# Patient Record
Sex: FEMALE | Race: WHITE | NOT HISPANIC OR LATINO | Employment: OTHER | URBAN - METROPOLITAN AREA
[De-identification: names, ages, dates, MRNs, and addresses within clinical notes are randomized per-mention and may not be internally consistent; named-entity substitution may affect disease eponyms.]

---

## 2017-01-13 ENCOUNTER — OFFICE VISIT (OUTPATIENT)
Dept: FAMILY MEDICINE | Facility: CLINIC | Age: 67
End: 2017-01-13
Payer: COMMERCIAL

## 2017-01-13 VITALS
SYSTOLIC BLOOD PRESSURE: 136 MMHG | TEMPERATURE: 98.6 F | BODY MASS INDEX: 36.46 KG/M2 | HEIGHT: 63 IN | HEART RATE: 79 BPM | WEIGHT: 205.8 LBS | DIASTOLIC BLOOD PRESSURE: 82 MMHG | OXYGEN SATURATION: 98 %

## 2017-01-13 DIAGNOSIS — F43.21 ADJUSTMENT DISORDER WITH DEPRESSED MOOD: Primary | ICD-10-CM

## 2017-01-13 DIAGNOSIS — J44.0 CHRONIC OBSTRUCTIVE PULMONARY DISEASE WITH ACUTE LOWER RESPIRATORY INFECTION (H): ICD-10-CM

## 2017-01-13 DIAGNOSIS — R63.5 ABNORMAL WEIGHT GAIN: ICD-10-CM

## 2017-01-13 PROCEDURE — 99214 OFFICE O/P EST MOD 30 MIN: CPT | Performed by: FAMILY MEDICINE

## 2017-01-13 RX ORDER — PHENTERMINE HYDROCHLORIDE 15 MG/1
15 CAPSULE ORAL EVERY MORNING
Qty: 30 CAPSULE | Refills: 0 | Status: SHIPPED | OUTPATIENT
Start: 2017-01-13 | End: 2017-02-09

## 2017-01-13 RX ORDER — VENLAFAXINE 37.5 MG/1
37.5-75 TABLET ORAL 2 TIMES DAILY
Qty: 120 TABLET | Refills: 0 | Status: SHIPPED | OUTPATIENT
Start: 2017-01-13 | End: 2017-03-16

## 2017-01-13 RX ORDER — CITALOPRAM HYDROBROMIDE 20 MG/1
20 TABLET ORAL DAILY
Qty: 30 TABLET | Refills: 1
Start: 2017-01-13 | End: 2017-03-16

## 2017-01-13 ASSESSMENT — ANXIETY QUESTIONNAIRES
3. WORRYING TOO MUCH ABOUT DIFFERENT THINGS: SEVERAL DAYS
IF YOU CHECKED OFF ANY PROBLEMS ON THIS QUESTIONNAIRE, HOW DIFFICULT HAVE THESE PROBLEMS MADE IT FOR YOU TO DO YOUR WORK, TAKE CARE OF THINGS AT HOME, OR GET ALONG WITH OTHER PEOPLE: SOMEWHAT DIFFICULT
1. FEELING NERVOUS, ANXIOUS, OR ON EDGE: SEVERAL DAYS
GAD7 TOTAL SCORE: 9
5. BEING SO RESTLESS THAT IT IS HARD TO SIT STILL: SEVERAL DAYS
6. BECOMING EASILY ANNOYED OR IRRITABLE: SEVERAL DAYS
7. FEELING AFRAID AS IF SOMETHING AWFUL MIGHT HAPPEN: MORE THAN HALF THE DAYS
2. NOT BEING ABLE TO STOP OR CONTROL WORRYING: SEVERAL DAYS

## 2017-01-13 ASSESSMENT — PATIENT HEALTH QUESTIONNAIRE - PHQ9: 5. POOR APPETITE OR OVEREATING: MORE THAN HALF THE DAYS

## 2017-01-13 NOTE — NURSING NOTE
"Chief Complaint   Patient presents with     Recheck Medication     Initial /82 mmHg  Pulse 79  Temp(Src) 98.6  F (37  C) (Oral)  Ht 5' 3\" (1.6 m)  Wt 205 lb 12.8 oz (93.35 kg)  BMI 36.46 kg/m2  SpO2 98%  LMP 02/06/1906 Estimated body mass index is 36.46 kg/(m^2) as calculated from the following:    Height as of this encounter: 5' 3\" (1.6 m).    Weight as of this encounter: 205 lb 12.8 oz (93.35 kg).  BP completed using cuff size large left Arm  Nadia Auuth CMA    "

## 2017-01-13 NOTE — Clinical Note
My Depression Action Plan  Name: Sushma Marie   Date of Birth 1950  Date: 1/13/2017    My doctor: oJel Barrera   My clinic: [address] [address]  [phone]          GREEN    ZONE   Good Control    What it looks like:     Things are going generally well. You have normal up s and down s. You may even feel depressed from time to time, but bad moods usually last less than a day.   What you need to do:  1. Continue to care for yourself (see self care plan)  2. Check your depression survival kit and update it as needed  3. Follow your physician s recommendations including any medication.  4. Do not stop taking medication unless you consult with your physician first.           YELLOW         ZONE Getting Worse    What it looks like:     Depression is starting to interfere with your life.     It may be hard to get out of bed; you may be starting to isolate yourself from others.    Symptoms of depression are starting to last most all day and this has happened for several days.     You may have suicidal thoughts but they are not constant.   What you need to do:     1. Call your care team, your response to treatment will improve if you keep your care team informed of your progress. Yellow periods are signs an adjustment may need to be made.     2. Continue your self-care, even if you have to fake it!    3. Talk to someone in your support network    4. Open up your depression survival kit           RED    ZONE Medical Alert - Get Help    What it looks like:     Depression is seriously interfering with your life.     You may experience these or other symptoms: You can t get out of bed most days, can t work or engage in other necessary activities, you have trouble taking care of basic hygiene, or basic responsibilities, thoughts of suicide or death that will not go away, self-injurious behavior.     What you need to do:  1. Call your care  team and request a same-day appointment. If they are not available (weekends or after hours) call your local crisis line, emergency room or 911.      Electronically signed by: Gavi Avitia, January 13, 2017    Depression Self Care Plan / Survival Kit    Self-Care for Depression  Here s the deal. Your body and mind are really not as separate as most people think.  What you do and think affects how you feel and how you feel influences what you do and think. This means if you do things that people who feel good do, it will help you feel better.  Sometimes this is all it takes.  There is also a place for medication and therapy depending on how severe your depression is, so be sure to consult with your medical provider and/ or Behavioral Health Consultant if your symptoms are worsening or not improving.     In order to better manage my stress, I will:    Exercise  Get some form of exercise, every day. This will help reduce pain and release endorphins, the  feel good  chemicals in your brain. This is almost as good as taking antidepressants!  This is not the same as joining a gym and then never going! (they count on that by the way ) It can be as simple as just going for a walk or doing some gardening, anything that will get you moving.      Hygiene   Maintain good hygiene (Get out of bed in the morning, Make your bed, Brush your teeth, Take a shower, and Get dressed like you were going to work, even if you are unemployed).  If your clothes don't fit try to get ones that do.    Diet  I will strive to eat foods that are good for me, drink plenty of water, and avoid excessive sugar, caffeine, alcohol, and other mood-altering substances.  Some foods that are helpful in depression are: complex carbohydrates, B vitamins, flaxseed, fish or fish oil, fresh fruits and vegetables.    Psychotherapy  I agree to participate in Individual Therapy (if recommended).    Medication  If prescribed medications, I agree to take them.   Missing doses can result in serious side effects.  I understand that drinking alcohol, or other illicit drug use, may cause potential side effects.  I will not stop my medication abruptly without first discussing it with my provider.    Staying Connected With Others  I will stay in touch with my friends, family members, and my primary care provider/team.    Use your imagination  Be creative.  We all have a creative side; it doesn t matter if it s oil painting, sand castles, or mud pies! This will also kick up the endorphins.    Witness Beauty  (AKA stop and smell the roses) Take a look outside, even in mid-winter. Notice colors, textures. Watch the squirrels and birds.     Service to others  Be of service to others.  There is always someone else in need.  By helping others we can  get out of ourselves  and remember the really important things.  This also provides opportunities for practicing all the other parts of the program.    Humor  Laugh and be silly!  Adjust your TV habits for less news and crime-drama and more comedy.    Control your stress  Try breathing deep, massage therapy, biofeedback, and meditation. Find time to relax each day.     My support system    Clinic Contact:  Phone number:    Contact 1:  Phone number:    Contact 2:  Phone number:    Christianity/:  Phone number:    Therapist:  Phone number:    Local crisis center:    Phone number:    Other community support:  Phone number:

## 2017-01-13 NOTE — PROGRESS NOTES
SUBJECTIVE:                                                    Sushma Marie is a 66 year old female who presents to clinic today for the following health issues:      Depression Followup    Status since last visit: Worsened - but only taking 1 tablet instead of 2 prescribed     See PHQ-9 for current symptoms.  Other associated symptoms: not wanting to do things she usually would did or liked     Complicating factors:   Significant life event:  No   Current substance abuse:  None  Anxiety or Panic symptoms:  No    PHQ-9  English PHQ-9   Any Language            Amount of exercise or physical activity: None    Problems taking medications regularly: Yes,  Only taking 1 a day instead of two - but certain times with specific events could use increased amount     Medication side effects: none    Diet: regular (no restrictions)      Problem list and histories reviewed & adjusted, as indicated.  Additional history: as documented    Patient Active Problem List   Diagnosis     Osteoporosis     COPD (chronic obstructive pulmonary disease) (H)     Dyslipidemia     HYPERLIPIDEMIA LDL GOAL <130     Adjustment disorder with depressed mood     Advanced directives, counseling/discussion     Past Surgical History   Procedure Laterality Date     Hc remove tonsils/adenoids,<12 y/o         Social History   Substance Use Topics     Smoking status: Former Smoker     Smokeless tobacco: Never Used      Comment: quit 12 years ago     Alcohol Use: Yes      Comment: social     Family History   Problem Relation Age of Onset     CANCER Mother       stomach cancer     Respiratory Father       emphysema and dementia     Family History Negative Brother       drug overdose     Family History Negative Sister      Family History Negative Sister       from status asthmaticus     HEART DISEASE Maternal Grandmother       stroke     HEART DISEASE Maternal Grandfather       MI     Anesthesia Reaction Daughter   "    Thyroid Disease Son      Asthma Child      OSTEOPOROSIS Maternal Aunt      Breast Cancer Maternal Aunt          Current Outpatient Prescriptions   Medication Sig Dispense Refill     venlafaxine (EFFEXOR) 37.5 MG tablet Take 1-2 tablets (37.5-75 mg) by mouth 2 times daily 120 tablet 0     citalopram (CELEXA) 20 MG tablet Take 1 tablet (20 mg) by mouth daily 30 tablet 1     phentermine 15 MG capsule Take 1 capsule (15 mg) by mouth every morning 30 capsule 0     atorvastatin (LIPITOR) 20 MG tablet Take 1 tablet (20 mg) by mouth daily 90 tablet 3     IBANdronate (BONIVA) 150 MG tablet Take 1 tablet (150 mg) by mouth every 30 days 12 tablet 1     SYMBICORT 80-4.5 MCG/ACT IN AERO two puffs bid       SPIRIVA HANDIHALER 18 MCG IN CAPS 1 CAPSULE DAILY       PROAIR  (90 BASE) MCG/ACT IN AERS INHALE 1 TO 2 PUFFS EVERY 4 TO 6 HOURS AS NEEDED 1 0     [DISCONTINUED] citalopram (CELEXA) 20 MG tablet Take 2 tablets (40 mg) by mouth daily 30 tablet 1     Allergies   Allergen Reactions     Sulfa [Sulfacetamide Sodium] GI Disturbance     Recent Labs   Lab Test  05/16/16   1430  04/06/15   1008  02/13/14   1433   LDL  84  73  72   HDL  69  75  66   TRIG  172*  82  59   ALT  26  27  32   CR  0.71  0.68  0.59   GFRESTIMATED  82  87  >90   GFRESTBLACK  >90   GFR Calc    >90   GFR Calc    >90   POTASSIUM  3.9  4.2  4.5      BP Readings from Last 3 Encounters:   01/13/17 136/82   05/16/16 138/84   04/06/15 120/70    Wt Readings from Last 3 Encounters:   01/13/17 205 lb 12.8 oz (93.35 kg)   05/16/16 201 lb 1.6 oz (91.218 kg)   04/06/15 195 lb 12.8 oz (88.814 kg)                    ROS:  Constitutional, HEENT, cardiovascular, pulmonary, gi and gu systems are negative, except as otherwise noted.    OBJECTIVE:                                                    /82 mmHg  Pulse 79  Temp(Src) 98.6  F (37  C) (Oral)  Ht 5' 3\" (1.6 m)  Wt 205 lb 12.8 oz (93.35 kg)  BMI 36.46 kg/m2  SpO2 98%  " LMP 02/06/1906  Body mass index is 36.46 kg/(m^2).  GENERAL: healthy, alert and no distress  NECK: no adenopathy, no asymmetry, masses, or scars and thyroid normal to palpation  RESP: lungs clear to auscultation - no rales, rhonchi or wheezes  CV: regular rate and rhythm, normal S1 S2, no S3 or S4, no murmur, click or rub, no peripheral edema and peripheral pulses strong  ABDOMEN: soft, nontender, no hepatosplenomegaly, no masses and bowel sounds normal  MS: no gross musculoskeletal defects noted, no edema         ASSESSMENT/PLAN:                                                    ASSESSMENT / PLAN:  (F43.21) Adjustment disorder with depressed mood  (primary encounter diagnosis)  Comment: has been worsening, will have her to try switching from citalopram to Effexor for the benefits of cardiovascular and safety window   Plan: venlafaxine (EFFEXOR) 37.5 MG tablet,         citalopram (CELEXA) 20 MG tablet        She will be at 75mg of Effexor by the end to next 30 days, and will contact us for updating. We will consider dose adjustment at the time if needed.     (R63.5) Abnormal weight gain  Comment: worsening with depressed mood and stress, will have her to try 1-2 months with phentermine  Plan: phentermine 15 MG capsule        If not effective, will have her to stop taking it     (J44.0) Chronic obstructive pulmonary disease with acute lower respiratory infection (H)  Comment: stable with current dose of regimen, will keep watching sx   Plan: mentioned above       FUTURE APPOINTMENTS:       - Follow-up visit in 6 months for med remington Melgoza MD  [facility]

## 2017-01-14 ASSESSMENT — ANXIETY QUESTIONNAIRES: GAD7 TOTAL SCORE: 9

## 2017-01-14 ASSESSMENT — PATIENT HEALTH QUESTIONNAIRE - PHQ9: SUM OF ALL RESPONSES TO PHQ QUESTIONS 1-9: 17

## 2017-02-09 DIAGNOSIS — R63.5 ABNORMAL WEIGHT GAIN: Primary | ICD-10-CM

## 2017-02-09 DIAGNOSIS — F43.21 ADJUSTMENT DISORDER WITH DEPRESSED MOOD: ICD-10-CM

## 2017-02-09 RX ORDER — PHENTERMINE HYDROCHLORIDE 15 MG/1
15 CAPSULE ORAL EVERY MORNING
Qty: 30 CAPSULE | Refills: 0 | Status: SHIPPED | OUTPATIENT
Start: 2017-02-09 | End: 2017-03-16

## 2017-02-09 RX ORDER — VENLAFAXINE 75 MG/1
75 TABLET ORAL 2 TIMES DAILY
Qty: 60 TABLET | Refills: 1 | Status: SHIPPED | OUTPATIENT
Start: 2017-02-09 | End: 2017-03-16

## 2017-02-09 RX ORDER — VENLAFAXINE 37.5 MG/1
75 TABLET ORAL 2 TIMES DAILY
Qty: 360 TABLET | Refills: 1 | OUTPATIENT
Start: 2017-02-09

## 2017-02-09 NOTE — TELEPHONE ENCOUNTER
I need to know how much Effexor she is taking, and     And, I need to know her wt current for me to refill med,

## 2017-02-09 NOTE — TELEPHONE ENCOUNTER
Spoke with patient, states medication is working really well and feeling much better. PHQ obtained. States phentermine is also working very well and is requesting a refill.     PHQ-9 SCORE 8/3/2016 1/13/2017 2/9/2017   Total Score - - -   Total Score MyChart - - -   Total Score 20 17 4         phentermine      Last Written Prescription Date:  1/13/17  Last Fill Quantity: 30,   # refills: 0  Last Office Visit with [facility] prescribing provider: 1/13/17  Future Office visit:       Routing refill request to provider for review/approval because:  Drug not on the [facility] or [facility] [place] refill protocol or controlled substance    Shannan Sanz RN   [facility] - Triage

## 2017-02-09 NOTE — TELEPHONE ENCOUNTER
1/13/17 OV note:    ASSESSMENT/PLAN:                                                      ASSESSMENT / PLAN:  (F43.21) Adjustment disorder with depressed mood  (primary encounter diagnosis)  Comment: has been worsening, will have her to try switching from citalopram to Effexor for the benefits of cardiovascular and safety window   Plan: venlafaxine (EFFEXOR) 37.5 MG tablet,          citalopram (CELEXA) 20 MG tablet        She will be at 75mg of Effexor by the end to next 30 days, and will contact us for updating. We will consider dose adjustment at the time if needed.             Need to call patient for update to see how increased dose is working for her.     Left non detailed message for patient to return call.  Shannan Sanz RN   [facility] - Triage

## 2017-02-09 NOTE — TELEPHONE ENCOUNTER
VENLAFAXINE HCL 37.5 MG TABLET    Last Written Prescription Date: 01/13/2017  Last Fill Quantity: 120, # refills: 0  Last Office Visit with [facility], [facility] or [facility] prescribing provider: 01/13/2017        BP Readings from Last 3 Encounters:   01/13/17 136/82   05/16/16 138/84   04/06/15 120/70     Pulse: (for F[place]ima)  CREATININE   Date Value Ref Range Status   05/16/2016 0.71 0.52 - 1.04 mg/dL Final   ]    Last PHQ-9 score on record=   PHQ-9 SCORE 1/13/2017   Total Score -   Total Score MyChart -   Total Score 17

## 2017-02-09 NOTE — TELEPHONE ENCOUNTER
Called patient, she is currently taking 37.5 mg 2 bid of Effexor    194 lb on Monday at June Pack RN  [facility]  [phone]

## 2017-02-09 NOTE — TELEPHONE ENCOUNTER
Please have her to [facility] for f/u at next month  And I will also send 75mg tablets Effexor, she can take 1 tablets twice daily

## 2017-02-10 ASSESSMENT — PATIENT HEALTH QUESTIONNAIRE - PHQ9: SUM OF ALL RESPONSES TO PHQ QUESTIONS 1-9: 4

## 2017-02-10 NOTE — TELEPHONE ENCOUNTER
Spoke with patient and informed of below. Patient verbalized understanding and agrees with plan.   Shannan Sanz RN   [facility] - Triage

## 2017-02-13 ENCOUNTER — TELEPHONE (OUTPATIENT)
Dept: FAMILY MEDICINE | Facility: CLINIC | Age: 67
End: 2017-02-13

## 2017-02-13 NOTE — TELEPHONE ENCOUNTER
Fax from pt's pharmacy stating her Phentermine isn't covered by her insurance.    Do you want to proceed with PA or change med?    Orin Barlow CMA

## 2017-03-16 ENCOUNTER — OFFICE VISIT (OUTPATIENT)
Dept: FAMILY MEDICINE | Facility: CLINIC | Age: 67
End: 2017-03-16
Payer: COMMERCIAL

## 2017-03-16 VITALS
DIASTOLIC BLOOD PRESSURE: 72 MMHG | BODY MASS INDEX: 32.07 KG/M2 | TEMPERATURE: 97.1 F | SYSTOLIC BLOOD PRESSURE: 122 MMHG | WEIGHT: 181 LBS | HEIGHT: 63 IN | HEART RATE: 97 BPM | OXYGEN SATURATION: 96 %

## 2017-03-16 DIAGNOSIS — F43.21 ADJUSTMENT DISORDER WITH DEPRESSED MOOD: ICD-10-CM

## 2017-03-16 DIAGNOSIS — R63.5 ABNORMAL WEIGHT GAIN: ICD-10-CM

## 2017-03-16 PROCEDURE — 99213 OFFICE O/P EST LOW 20 MIN: CPT | Performed by: FAMILY MEDICINE

## 2017-03-16 RX ORDER — VENLAFAXINE 75 MG/1
75 TABLET ORAL 2 TIMES DAILY
Qty: 180 TABLET | Refills: 3 | Status: SHIPPED | OUTPATIENT
Start: 2017-03-16 | End: 2017-06-05

## 2017-03-16 RX ORDER — PHENTERMINE HYDROCHLORIDE 15 MG/1
15 CAPSULE ORAL EVERY MORNING
Qty: 30 CAPSULE | Refills: 0 | Status: SHIPPED | OUTPATIENT
Start: 2017-03-16 | End: 2017-07-13

## 2017-03-16 ASSESSMENT — ANXIETY QUESTIONNAIRES
1. FEELING NERVOUS, ANXIOUS, OR ON EDGE: SEVERAL DAYS
7. FEELING AFRAID AS IF SOMETHING AWFUL MIGHT HAPPEN: SEVERAL DAYS
5. BEING SO RESTLESS THAT IT IS HARD TO SIT STILL: NOT AT ALL
GAD7 TOTAL SCORE: 5
IF YOU CHECKED OFF ANY PROBLEMS ON THIS QUESTIONNAIRE, HOW DIFFICULT HAVE THESE PROBLEMS MADE IT FOR YOU TO DO YOUR WORK, TAKE CARE OF THINGS AT HOME, OR GET ALONG WITH OTHER PEOPLE: NOT DIFFICULT AT ALL
6. BECOMING EASILY ANNOYED OR IRRITABLE: SEVERAL DAYS
2. NOT BEING ABLE TO STOP OR CONTROL WORRYING: SEVERAL DAYS
3. WORRYING TOO MUCH ABOUT DIFFERENT THINGS: SEVERAL DAYS

## 2017-03-16 ASSESSMENT — PATIENT HEALTH QUESTIONNAIRE - PHQ9: 5. POOR APPETITE OR OVEREATING: NOT AT ALL

## 2017-03-16 NOTE — NURSING NOTE
"Chief Complaint   Patient presents with     Recheck Medication       Initial /72  Pulse 97  Temp 97.1  F (36.2  C) (Tympanic)  Ht 5' 3\" (1.6 m)  Wt 181 lb (82.1 kg)  LMP 02/06/1906  SpO2 96%  BMI 32.06 kg/m2 Estimated body mass index is 32.06 kg/(m^2) as calculated from the following:    Height as of this encounter: 5' 3\" (1.6 m).    Weight as of this encounter: 181 lb (82.1 kg).  Medication Reconciliation: complete     Orin Barlow CMA      "

## 2017-03-16 NOTE — MR AVS SNAPSHOT
"              After Visit Summary   3/16/2017    Sushma Marie    MRN: [id number]           Patient Information     Date Of Birth          1950        Visit Information        Provider Department      3/16/2017 12:40 PM Manjit Melgoza MD [facility] [place]        Today's Diagnoses     Adjustment disorder with depressed mood        Abnormal weight gain           Follow-ups after your visit        Who to contact     If you have questions or need follow up information about today's clinic visit or your schedule please contact [facility] directly at [phone].  Normal or non-critical lab and imaging results will be communicated to you by [employer], letter or phone within 4 business days after the clinic has received the results. If you do not hear from us within 7 days, please contact the clinic through [employer] or phone. If you have a critical or abnormal lab result, we will notify you by phone as soon as possible.  Submit refill requests through [employer] or call your pharmacy and they will forward the refill request to us. Please allow 3 business days for your refill to be completed.          Additional Information About Your Visit        MyChart Information     [employer] gives you secure access to your electronic health record. If you see a primary care provider, you can also send messages to your care team and make appointments. If you have questions, please call your primary care clinic.  If you do not have a primary care provider, please call [phone] and they will assist you.        Care EveryWhere ID     This is your Care EveryWhere ID. This could be used by other organizations to access your [facility] medical records  [id number]        Your Vitals Were     Pulse Temperature Height Last Period Pulse Oximetry BMI (Body Mass Index)    97 97.1  F (36.2  C) (Tympanic) 5' 3\" (1.6 m) 02/06/1906 96% 32.06 kg/m2       Blood Pressure from Last 3 Encounters:   03/16/17 122/72   01/13/17 " 136/82   05/16/16 138/84    Weight from Last 3 Encounters:   03/16/17 181 lb (82.1 kg)   01/13/17 205 lb 12.8 oz (93.4 kg)   05/16/16 201 lb 1.6 oz (91.2 kg)              Today, you had the following     No orders found for display         Where to get your medicines      These medications were sent to [place] [address] [place] [facility] [place] - [address]  [address] [place] [address]     Phone:  [phone]     venlafaxine 75 MG tablet         Some of these will need a paper prescription and others can be bought over the counter.  Ask your nurse if you have questions.     Bring a paper prescription for each of these medications     phentermine 15 MG capsule          Primary Care Provider    No Pcp Confirmed       No address on file        Thank you!     Thank you for choosing [facility] [place]  for your care. Our goal is always to provide you with excellent care. Hearing back from our patients is one way we can continue to improve our services. Please take a few minutes to complete the written survey that you may receive in the mail after your visit with us. Thank you!             Your Updated Medication List - Protect others around you: Learn how to safely use, store and throw away your medicines at www.disposemymeds.org.          This list is accurate as of: 3/16/17 12:52 PM.  Always use your most recent med list.                   Brand Name Dispense Instructions for use    albuterol 108 (90 BASE) MCG/ACT Inhaler   Generic drug:  albuterol     1    INHALE 1 TO 2 PUFFS EVERY 4 TO 6 HOURS AS NEEDED       atorvastatin 20 MG tablet    LIPITOR    90 tablet    Take 1 tablet (20 mg) by mouth daily       IBANdronate 150 MG tablet    BONIVA    12 tablet    Take 1 tablet (150 mg) by mouth every 30 days       phentermine 15 MG capsule     30 capsule    Take 1 capsule (15 mg) by mouth every morning       SPIRIVA HANDIHALER 18 MCG capsule   Generic drug:  tiotropium      1 CAPSULE DAILY       SYMBICORT  80-4.5 MCG/ACT Inhaler   Generic drug:  budesonide-formoterol      two puffs bid       venlafaxine 75 MG tablet    EFFEXOR    180 tablet    Take 1 tablet (75 mg) by mouth 2 times daily

## 2017-03-16 NOTE — PROGRESS NOTES
SUBJECTIVE:                                                    Sushma Marie is a 66 year old female who presents to clinic today for the following health issues:      Medication Followup of recheck medication    Taking Medication as prescribed: yes    Side Effects:  None    Medication Helping Symptoms:  yes     Problem list and histories reviewed & adjusted, as indicated.  Additional history: as documented    Patient Active Problem List   Diagnosis     Osteoporosis     COPD (chronic obstructive pulmonary disease) (H)     Dyslipidemia     HYPERLIPIDEMIA LDL GOAL <130     Adjustment disorder with depressed mood     Advanced directives, counseling/discussion     Past Surgical History   Procedure Laterality Date     Hc remove tonsils/adenoids,<10 y/o         Social History   Substance Use Topics     Smoking status: Former Smoker     Smokeless tobacco: Never Used      Comment: quit 12 years ago     Alcohol use Yes      Comment: social     Family History   Problem Relation Age of Onset     CANCER Mother       stomach cancer     Respiratory Father       emphysema and dementia     Family History Negative Brother       drug overdose     Family History Negative Sister      Family History Negative Sister       from status asthmaticus     HEART DISEASE Maternal Grandmother       stroke     HEART DISEASE Maternal Grandfather       MI     Anesthesia Reaction Daughter      Thyroid Disease Son      Asthma Child      OSTEOPOROSIS Maternal Aunt      Breast Cancer Maternal Aunt          Current Outpatient Prescriptions   Medication Sig Dispense Refill     venlafaxine (EFFEXOR) 75 MG tablet Take 1 tablet (75 mg) by mouth 2 times daily 180 tablet 3     phentermine 15 MG capsule Take 1 capsule (15 mg) by mouth every morning 30 capsule 0     atorvastatin (LIPITOR) 20 MG tablet Take 1 tablet (20 mg) by mouth daily 90 tablet 3     IBANdronate (BONIVA) 150 MG tablet Take 1 tablet (150 mg) by  "mouth every 30 days 12 tablet 1     SYMBICORT 80-4.5 MCG/ACT IN AERO two puffs bid       SPIRIVA HANDIHALER 18 MCG IN CAPS 1 CAPSULE DAILY       PROAIR  (90 BASE) MCG/ACT IN AERS INHALE 1 TO 2 PUFFS EVERY 4 TO 6 HOURS AS NEEDED 1 0     [DISCONTINUED] venlafaxine (EFFEXOR) 75 MG tablet Take 1 tablet (75 mg) by mouth 2 times daily 60 tablet 1     [DISCONTINUED] venlafaxine (EFFEXOR) 37.5 MG tablet Take 1-2 tablets (37.5-75 mg) by mouth 2 times daily (Patient not taking: Reported on 3/16/2017) 120 tablet 0     Allergies   Allergen Reactions     Sulfa [Sulfacetamide Sodium] GI Disturbance     Recent Labs   Lab Test  05/16/16   1430  04/06/15   1008  02/13/14   1433   LDL  84  73  72   HDL  69  75  66   TRIG  172*  82  59   ALT  26  27  32   CR  0.71  0.68  0.59   GFRESTIMATED  82  87  >90   GFRESTBLACK  >90   GFR Calc    >90   GFR Calc    >90   POTASSIUM  3.9  4.2  4.5      BP Readings from Last 3 Encounters:   03/16/17 122/72   01/13/17 136/82   05/16/16 138/84    Wt Readings from Last 3 Encounters:   03/16/17 181 lb (82.1 kg)   01/13/17 205 lb 12.8 oz (93.4 kg)   05/16/16 201 lb 1.6 oz (91.2 kg)                    Reviewed and updated as needed this visit by clinical staff  Tobacco  Allergies  Meds  Med Hx  Surg Hx  Fam Hx  Soc Hx      Reviewed and updated as needed this visit by Provider         ROS:  Constitutional, HEENT, cardiovascular, pulmonary, gi and gu systems are negative, except as otherwise noted.    OBJECTIVE:                                                    /72  Pulse 97  Temp 97.1  F (36.2  C) (Tympanic)  Ht 5' 3\" (1.6 m)  Wt 181 lb (82.1 kg)  LMP 02/06/1906  SpO2 96%  BMI 32.06 kg/m2  Body mass index is 32.06 kg/(m^2).  GENERAL: healthy, alert and no distress  NECK: no adenopathy, no asymmetry, masses, or scars and thyroid normal to palpation  RESP: lungs clear to auscultation - no rales, rhonchi or wheezes  CV: regular rate and rhythm, normal S1 S2, " no S3 or S4, no murmur, click or rub, no peripheral edema and peripheral pulses strong  ABDOMEN: soft, nontender, no hepatosplenomegaly, no masses and bowel sounds normal  MS: no gross musculoskeletal defects noted, no edema         ASSESSMENT/PLAN:                                                    Sushma was seen today for recheck medication.    Diagnoses and all orders for this visit:    Adjustment disorder with depressed mood  -     venlafaxine (EFFEXOR) 75 MG tablet; Take 1 tablet (75 mg) by mouth 2 times daily    Abnormal weight gain  -     phentermine 15 MG capsule; Take 1 capsule (15 mg) by mouth every morning          FUTURE APPOINTMENTS:       - Follow-up visit in 1 year    Manjit Melgoza MD  [facility] [place] [facility]

## 2017-03-17 ASSESSMENT — ANXIETY QUESTIONNAIRES: GAD7 TOTAL SCORE: 5

## 2017-03-17 ASSESSMENT — PATIENT HEALTH QUESTIONNAIRE - PHQ9: SUM OF ALL RESPONSES TO PHQ QUESTIONS 1-9: 3

## 2017-05-20 DIAGNOSIS — E78.5 HYPERLIPIDEMIA LDL GOAL <100: ICD-10-CM

## 2017-05-22 RX ORDER — ATORVASTATIN CALCIUM 20 MG/1
TABLET, FILM COATED ORAL
Qty: 30 TABLET | Refills: 0 | Status: SHIPPED | OUTPATIENT
Start: 2017-05-22 | End: 2017-07-07

## 2017-05-22 NOTE — TELEPHONE ENCOUNTER
Lipitor     Last Written Prescription Date: 5/16/16  Last Fill Quantity: 90, # refills: 3  Last Office Visit with G, P or [facility] prescribing provider: 3/16/17       Lab Results   Component Value Date    CHOL 187 05/16/2016     Lab Results   Component Value Date    HDL 69 05/16/2016     Lab Results   Component Value Date    LDL 84 05/16/2016     Lab Results   Component Value Date    TRIG 172 05/16/2016     Lab Results   Component Value Date    CHOLHDLRATIO 2.2 04/06/2015

## 2017-05-22 NOTE — TELEPHONE ENCOUNTER
Ok x 30 days per [facility] protocol    Estephania Pack,RN  [facility]  [phone]

## 2017-06-05 ENCOUNTER — TELEPHONE (OUTPATIENT)
Dept: FAMILY MEDICINE | Facility: CLINIC | Age: 67
End: 2017-06-05

## 2017-06-05 DIAGNOSIS — F43.21 ADJUSTMENT DISORDER WITH DEPRESSED MOOD: Primary | ICD-10-CM

## 2017-06-05 RX ORDER — VENLAFAXINE HYDROCHLORIDE 75 MG/1
75 CAPSULE, EXTENDED RELEASE ORAL DAILY
Qty: 90 CAPSULE | Refills: 3 | Status: SHIPPED | OUTPATIENT
Start: 2017-06-05 | End: 2017-07-13

## 2017-06-05 NOTE — TELEPHONE ENCOUNTER
Patient calling to see if she can change her medication. States her effexor is BID dosing and Dr. Melgoza had mentioned at LOV switching her to once daily dosing if she is interested. Patient would like to see if this is an option, pharmacy pended.     Triage to call with response [phone] okay to leave detailed message.     Shannan Sanz RN   [facility] - Triage

## 2017-06-08 ENCOUNTER — TRANSFERRED RECORDS (OUTPATIENT)
Dept: HEALTH INFORMATION MANAGEMENT | Facility: CLINIC | Age: 67
End: 2017-06-08

## 2017-07-07 DIAGNOSIS — E78.5 HYPERLIPIDEMIA LDL GOAL <100: ICD-10-CM

## 2017-07-07 RX ORDER — ATORVASTATIN CALCIUM 20 MG/1
TABLET, FILM COATED ORAL
Qty: 15 TABLET | Refills: 0 | Status: SHIPPED | OUTPATIENT
Start: 2017-07-07 | End: 2017-07-20

## 2017-07-07 NOTE — TELEPHONE ENCOUNTER
atorvastatin       Last Written Prescription Date: 5/22/17  Last Fill Quantity: 30, # refills: 0    Last Office Visit with [facility] or [facility] prescribing provider:  3/16/17   Future Office Visit:    Next 5 appointments (look out 90 days)     Jul 20, 2017  2:00 PM CDT   PHYSICAL with Joel Barrera MD   [facility]    [address] [place] [id number] [phone]                  Cholesterol   Date Value Ref Range Status   05/16/2016 187 <200 mg/dL Final     HDL Cholesterol   Date Value Ref Range Status   05/16/2016 69 >49 mg/dL Final     LDL Cholesterol Calculated   Date Value Ref Range Status   05/16/2016 84 <100 mg/dL Final     Comment:     Desirable:       <100 mg/dl     Triglycerides   Date Value Ref Range Status   05/16/2016 172 (H) <150 mg/dL Final     Comment:     Borderline high:  150-199 mg/dl   High:             200-499 mg/dl   Very high:       >499 mg/dl       Cholesterol/HDL Ratio   Date Value Ref Range Status   04/06/2015 2.2 0.0 - 5.0 Final     ALT   Date Value Ref Range Status   05/16/2016 26 0 - 50 U/L Final

## 2017-07-07 NOTE — TELEPHONE ENCOUNTER
Routing refill request to provider for review/approval because:  Perla given x1 and patient did not follow up, please advise  Labs not current:  lipids

## 2017-07-13 ENCOUNTER — TELEPHONE (OUTPATIENT)
Dept: FAMILY MEDICINE | Facility: CLINIC | Age: 67
End: 2017-07-13

## 2017-07-13 DIAGNOSIS — F43.21 ADJUSTMENT DISORDER WITH DEPRESSED MOOD: Primary | ICD-10-CM

## 2017-07-13 RX ORDER — VENLAFAXINE 75 MG/1
150 TABLET ORAL 2 TIMES DAILY
Qty: 360 TABLET | Refills: 1 | Status: SHIPPED | OUTPATIENT
Start: 2017-07-13 | End: 2018-02-22

## 2017-07-13 NOTE — TELEPHONE ENCOUNTER
Called patient and notified of Dr. Hernandez message.    Estephania Pack,RN  [facility]  [phone]

## 2017-07-13 NOTE — TELEPHONE ENCOUNTER
Dose conversion from bid to XR was right.   However, if her sx worsening , will increase it to 150mg bid   thx

## 2017-07-13 NOTE — TELEPHONE ENCOUNTER
Patient calling to state that she is concerned that her medication are not working as well.    patient had called 06/5/17 and requested a once a day dosing for the venlafaxine- this had been discussed at a previous OV    Patient was on 75 mg bid and wanted to go to a once a day dose of 150 mg  She thought she would get a 150 mg tablet but instead her dose was just cut to one tablet of the 75 mg in the XR form  She has really noticed that this is not helping her depression  Patient wants the mg increased on the medication or to go back to the bid dosing.    Please advise,    Estephania Pack,RN  [facility]  [phone]

## 2017-07-20 ENCOUNTER — OFFICE VISIT (OUTPATIENT)
Dept: OBGYN | Facility: CLINIC | Age: 67
End: 2017-07-20
Payer: COMMERCIAL

## 2017-07-20 VITALS
HEART RATE: 70 BPM | OXYGEN SATURATION: 99 % | BODY MASS INDEX: 31.53 KG/M2 | WEIGHT: 178 LBS | DIASTOLIC BLOOD PRESSURE: 62 MMHG | SYSTOLIC BLOOD PRESSURE: 118 MMHG

## 2017-07-20 DIAGNOSIS — M81.0 AGE-RELATED OSTEOPOROSIS WITHOUT CURRENT PATHOLOGICAL FRACTURE: ICD-10-CM

## 2017-07-20 DIAGNOSIS — Z01.411 ENCOUNTER FOR GYNECOLOGICAL EXAMINATION WITH ABNORMAL FINDING: ICD-10-CM

## 2017-07-20 DIAGNOSIS — E78.5 HYPERLIPIDEMIA LDL GOAL <130: ICD-10-CM

## 2017-07-20 DIAGNOSIS — L29.9 ITCHY SCALP: ICD-10-CM

## 2017-07-20 DIAGNOSIS — L65.9 HAIR LOSS: Primary | ICD-10-CM

## 2017-07-20 LAB
CHOLEST SERPL-MCNC: 221 MG/DL
HDLC SERPL-MCNC: 96 MG/DL
LDLC SERPL CALC-MCNC: 97 MG/DL
NONHDLC SERPL-MCNC: 125 MG/DL
TRIGL SERPL-MCNC: 142 MG/DL

## 2017-07-20 PROCEDURE — 99214 OFFICE O/P EST MOD 30 MIN: CPT | Performed by: OBSTETRICS & GYNECOLOGY

## 2017-07-20 PROCEDURE — 36415 COLL VENOUS BLD VENIPUNCTURE: CPT | Performed by: OBSTETRICS & GYNECOLOGY

## 2017-07-20 PROCEDURE — 80061 LIPID PANEL: CPT | Performed by: OBSTETRICS & GYNECOLOGY

## 2017-07-20 RX ORDER — ATORVASTATIN CALCIUM 20 MG/1
TABLET, FILM COATED ORAL
Qty: 15 TABLET | Refills: 0 | Status: SHIPPED | OUTPATIENT
Start: 2017-07-20 | End: 2017-08-10

## 2017-07-20 NOTE — MR AVS SNAPSHOT
After Visit Summary   7/20/2017    Sushma Marie    MRN: [id number]           Patient Information     Date Of Birth          1950        Visit Information        Provider Department      7/20/2017 2:00 PM Joel Barrera MD [facility]        Today's Diagnoses     Hair loss    -  1    Itchy scalp        Age-related osteoporosis without current pathological fracture        Hyperlipidemia LDL goal <130        Encounter for gynecological examination with abnormal finding          Care Instructions    You can reach your [place] Care Team any time of the day by calling [phone]. This number will put you in touch with the 24 hour nurse line if the clinic is closed.    To contact your OB/GYN Surgery Scheduler please call [phone]. This is a direct number for your care team between 8 a.m. and 4 p.m. Monday through Friday.    [facility] is open for your convenience: [phone]  Monday through Friday 8 a.m. to 8:30 p.m.  Saturday 9 a.m. to 6 p.m.  Sunday Noon to 6 p.m.    They are closed on all major holidays.      Please follow up for 3D digital mammograms each year.    I have put in an order for you to see an endocrinologist regarding bone health and hair loss.    Be sure to focus on core strength and balance!    I will contact you with your lab results          Follow-ups after your visit        Additional Services     ENDOCRINOLOGY ADULT REFERRAL       Your provider has referred you to: N: [facility] [phone]   [email or web address]      Please be aware that coverage of these services is subject to the terms and limitations of your health insurance plan.  Call member services at your health plan with any benefit or coverage questions.      Please bring the following to your appointment:    >>   Any x-rays, CTs or MRIs which have been performed.  Contact the facility where they were done to arrange for  prior to  your scheduled appointment.    >>   List of current medications   >>   This referral request   >>   Any documents/labs given to you for this referral                  Who to contact     If you have questions or need follow up information about today's clinic visit or your schedule please contact [facility] directly at [phone].  Normal or non-critical lab and imaging results will be communicated to you by MyChart, letter or phone within 4 business days after the clinic has received the results. If you do not hear from us within 7 days, please contact the clinic through [employer] or phone. If you have a critical or abnormal lab result, we will notify you by phone as soon as possible.  Submit refill requests through [employer] or call your pharmacy and they will forward the refill request to us. Please allow 3 business days for your refill to be completed.          Additional Information About Your Visit        MyChart Information     [employer] gives you secure access to your electronic health record. If you see a primary care provider, you can also send messages to your care team and make appointments. If you have questions, please call your primary care clinic.  If you do not have a primary care provider, please call [phone] and they will assist you.        Care EveryWhere ID     This is your Care EveryWhere ID. This could be used by other organizations to access your [place] medical records  [id number]        Your Vitals Were     Pulse Last Period Pulse Oximetry BMI (Body Mass Index)          70 02/06/1906 99% 31.53 kg/m2         Blood Pressure from Last 3 Encounters:   07/20/17 118/62   03/16/17 122/72   01/13/17 136/82    Weight from Last 3 Encounters:   07/20/17 178 lb (80.7 kg)   03/16/17 181 lb (82.1 kg)   01/13/17 205 lb 12.8 oz (93.4 kg)              We Performed the Following     ENDOCRINOLOGY ADULT REFERRAL     Lipid panel reflex to direct LDL          Today's Medication  Changes          These changes are accurate as of: 7/20/17 11:59 PM.  If you have any questions, ask your nurse or doctor.               These medicines have changed or have updated prescriptions.        Dose/Directions    atorvastatin 20 MG tablet   Commonly known as:  LIPITOR   This may have changed:  See the new instructions.   Used for:  Hyperlipidemia LDL goal <130   Changed by:  Joel Barrera MD        TAKE 1 TABLET BY MOUTH ONCE DAILY *DUE FOR LABS*   Quantity:  15 tablet   Refills:  0            Where to get your medicines      These medications were sent to [facility] [address] [facility] - [place], [place] - [address]  [address] [facility], [place] [address]     Phone:  [phone]     atorvastatin 20 MG tablet                Primary Care Provider Office Phone # Fax #    Manjit Melgoza [phone]       [address] [address]        Equal Access to Services     [facility]: Hadii aad ku hadasho Soomaali, waaxda luqadaha, qaybta kaalmada adeegyada, waxay lisain hayaan karime mcmillan . So [facility] [phone].    ATENCIÓN: Si habla español, tiene a cottrell disposición servicios gratuitos de asistencia lingüística. Shannon al [phone].    We comply with applicable federal civil rights laws and [place] laws. We do not discriminate on the basis of race, color, national origin, age, disability sex, sexual orientation or gender identity.            Thank you!     Thank you for choosing [facility]  for your care. Our goal is always to provide you with excellent care. Hearing back from our patients is one way we can continue to improve our services. Please take a few minutes to complete the written survey that you may receive in the mail after your visit with us. Thank you!             Your Updated Medication List - Protect others around you: Learn how to safely use, store and throw away your medicines at www.disposemymeds.org.           This list is accurate as of: 7/20/17 11:59 PM.  Always use your most recent med list.                   Brand Name Dispense Instructions for use Diagnosis    albuterol 108 (90 BASE) MCG/ACT Inhaler   Generic drug:  albuterol     1    INHALE 1 TO 2 PUFFS EVERY 4 TO 6 HOURS AS NEEDED    Mild intermittent asthma       atorvastatin 20 MG tablet    LIPITOR    15 tablet    TAKE 1 TABLET BY MOUTH ONCE DAILY *DUE FOR LABS*    Hyperlipidemia LDL goal <130       IBANdronate 150 MG tablet    BONIVA    12 tablet    Take 1 tablet (150 mg) by mouth every 30 days    Age-related osteoporosis without current pathological fracture       SPIRIVA HANDIHALER 18 MCG capsule   Generic drug:  tiotropium      1 CAPSULE DAILY        SYMBICORT 80-4.5 MCG/ACT Inhaler   Generic drug:  budesonide-formoterol      two puffs bid        venlafaxine 75 MG tablet    EFFEXOR    360 tablet    Take 2 tablets (150 mg) by mouth 2 times daily    Adjustment disorder with depressed mood

## 2017-07-20 NOTE — PATIENT INSTRUCTIONS
You can reach your [place] Care Team any time of the day by calling [phone]. This number will put you in touch with the 24 hour nurse line if the clinic is closed.    To contact your OB/GYN Surgery Scheduler please call [phone]. This is a direct number for your care team between 8 a.m. and 4 p.m. Monday through Friday.    [facility] is open for your convenience: [phone]  Monday through Friday 8 a.m. to 8:30 p.m.  Saturday 9 a.m. to 6 p.m.  Sunday Noon to 6 p.m.    They are closed on all major holidays.      Please follow up for 3D digital mammograms each year.    I have put in an order for you to see an endocrinologist regarding bone health and hair loss.    Be sure to focus on core strength and balance!    I will contact you with your lab results

## 2017-07-20 NOTE — PROGRESS NOTES
HPI:  Sushma Marie is a 67 year old white female , postmenopausal, who presents for an annual exam and pap.  She states that she is healthy, but is worried about recent increase in hair loss. Has noticed some specific Thinning on the top of her head, and complains of scalp itchiness. No flakiness. Uses conditioner and does heat styling. She reports that she has lost weight And readings are  As noted below.  Self breast exam,  ACS screening mammogram recs (16, normal results),  the use of 81 mg ASA to decrease the risk of heart disease, lipid screening (16, elevated triglycerides), colon cancer screening recs (4/16/15) and Dexa scan recs thoroughly reveiwed. Last pap exam was 16 with normal results and negative HSV.    Wt Readings from Last 3 Encounters:   17 80.7 kg (178 lb)   17 82.1 kg (181 lb)   17 93.4 kg (205 lb 12.8 oz)       Past Medical History:   Diagnosis Date     COPD (chronic obstructive pulmonary disease) (H)      Hyperlipidemia      Mild intermittent asthma     Exercise induced     Osteoporosis, unspecified      Past Surgical History:   Procedure Laterality Date     HC REMOVE TONSILS/ADENOIDS,<13 Y/O       Family History   Problem Relation Age of Onset     CANCER Mother       stomach cancer     Respiratory Father       emphysema and dementia     Family History Negative Brother       drug overdose     Family History Negative Sister      Family History Negative Sister       from status asthmaticus     HEART DISEASE Maternal Grandmother       stroke     HEART DISEASE Maternal Grandfather       MI     Anesthesia Reaction Daughter      Thyroid Disease Son      Asthma Child      OSTEOPOROSIS Maternal Aunt      Breast Cancer Maternal Aunt      Social History     Social History     Marital status:      Spouse name: Darwin     Number of children: 2     Years of education: 16     Occupational History            [employer]     Social History Main Topics     Smoking status: Former Smoker     Smokeless tobacco: Never Used      Comment: quit 12 years ago     Alcohol use Yes      Comment: social     Drug use: No     Sexual activity: Yes     Partners: Male     Birth control/ protection: Post-menopausal     Other Topics Concern     Not on file     Social History Narrative     Allergies:  Sulfa [sulfacetamide sodium]    Current Outpatient Prescriptions   Medication Sig Dispense Refill     venlafaxine (EFFEXOR) 75 MG tablet Take 2 tablets (150 mg) by mouth 2 times daily 360 tablet 1     atorvastatin (LIPITOR) 20 MG tablet TAKE 1 TABLET BY MOUTH ONCE DAILY *DUE FOR LABS* 15 tablet 0     IBANdronate (BONIVA) 150 MG tablet Take 1 tablet (150 mg) by mouth every 30 days 12 tablet 1     SYMBICORT 80-4.5 MCG/ACT IN AERO two puffs bid       SPIRIVA HANDIHALER 18 MCG IN CAPS 1 CAPSULE DAILY       PROAIR  (90 BASE) MCG/ACT IN AERS INHALE 1 TO 2 PUFFS EVERY 4 TO 6 HOURS AS NEEDED 1 0       ROS:  C: NEGATIVE for fever, chills, change in weight  I: NEGATIVE for worrisome rashes, moles or lesions. Derm mole check recommended  E: NEGATIVE for vision changes or irritation  E/M: NEGATIVE for ear, mouth and throat problems  R: NEGATIVE for significant cough or SOB  B: NEGATIVE for masses, tenderness or discharge  CV: NEGATIVE for chest pain, palpitations or peripheral edema  GI: NEGATIVE for nausea, abdominal pain, heartburn, or change in bowel habits  : NEGATIVE for frequency, dysuria, or hematuria  M: NEGATIVE for significant arthralgias or myalgia  N: NEGATIVE for weakness, dizziness or paresthesias  E: NEGATIVE for temperature intolerance. POSITIVE for hair loss.  H: NEGATIVE for bleeding problems  P: NEGATIVE for changes in mood or affect    This document serves as a record of the services and decisions personally performed and made by Joel Barrera M.D. It was created on his behalf by Ofelia Martin, a trained medical  scribe. The creation of this document is based the provider's statements to the medical scribe.  Ofelia Martin July 20, 2017 1:20 PM    EXAM:  Vitals: /62  Pulse 70  Wt 80.7 kg (178 lb)  LMP 02/06/1906  SpO2 99%  BMI 31.53 kg/m2  BMI= Body mass index is 31.53 kg/(m^2).  Constitutional: healthy, alert and no distress  Head: Normocephalic. No masses, lesions, tenderness or abnormalities  Scalp: healthy, thinning of hair on crown of head, no inflammation, no irritation. Hair appears healthy  Neck: Neck supple. No adenopathy. Thyroid symmetric, normal size,, Carotids without bruits.  ENT: NEGATIVE for ear, mouth and throat problems  Breast:  breasts symmetric, no dominant or suspicious mass, no skin or nipple changes or no axillary adenopathy  Cardiovascular: PMI normal. No lifts, heaves, or thrills. RRR. No murmurs, clicks gallops or rub  Respiratory: Percussion normal. Good diaphragmatic excursion. Lungs clear  Gastrointestinal: Abdomen soft, non-tender. BS normal. No masses, organomegaly  Genitourinary: Pelvic Exam:  External Genitalia:     Normal appearance for age, no discharge present, no tenderness present, no inflammatory lesions present, color normal  Vagina:     Normal vaginal vault without central or paravaginal defects, no discharge present, no inflammatory lesions present, no masses present. Normal post-menopausal changes present.  Bladder:     Nontender to palpation  Urethra:   Urethral Body:  Urethra palpation normal, urethra structural support normal   Urethral Meatus:  No erythema or lesions present  Cervix:     Appearance healthy, no lesions present, nontender to palpation, no bleeding present  Uterus:     Uterus: firm, normal sized and nontender  Ovaries:   Small, palpable  Adnexa:     No adnexal tenderness present, no adnexal masses present  Perineum:     Perineum within normal limits, no evidence of trauma, no rashes or skin lesions present  Anus:     Anus within normal limits, no  hemorrhoids present  Inguinal Lymph Nodes:     No lymphadenopathy present  Pubic Hair:     Normal pubic hair distribution for age  Genitalia and Groin:     No rashes present, no lesions present, no areas of discoloration, no masses present  Musculoskeletalextremities normal- no gross deformities noted, gait normal and normal muscle tone  Integument: no suspicious lesions or rashes  Neurologic: Gait normal. Reflexes normal and symmetric. Sensation grossly WNL.  Psychiatric: mentation appears normal and affect normal/bright       ASSESSMENT:/Plan  (L65.9) Hair loss  (primary encounter diagnosis)  Comment: Patient has been experiencing hair loss on the crown of the head, general hair thinning and itchy scalp recently.  Plan: Dermatology ADULT REFERRAL, **TSH with free         T4 reflex FUTURE anytime    (L29.9) Itchy scalp  Comment: I do not see evidence of split ends or inflammatory scalp condition. I do not see evidence of alopecia areata  Plan: Dermatology   ADULT REFERRAL The patient has a dermatologist that she sees regularly Dr. Aguilar          (M81.0) Age-related osteoporosis without current pathological fracture  Comment: Patient has not been taking her osteoporosis medication regularly, but states that she will resume it today.  Plan: ENDOCRINOLOGY ADULT REFERRAL    (E78.5) Hyperlipidemia LDL goal <130  Plan: Lipid panel reflex to direct LDL,  We'll check lipid panel and comprehensive metabolic panel with appropriate therapy to follow       Comprehensive metabolic panel FUTURE 6mo,         atorvastatin (LIPITOR) 20 MG tablet    =    (Z01.411) Encounter for gynecological examination with abnormal finding  Comment: Otherwise unremarkable GYN exam  Plan: Detailed written outline and plan given the patient        Written decision aid provided to the patient.    The information in this document, created by the medical scribe for me, accurately reflects the services I personally performed and the decisions made by  me. I have reviewed and approved this document for accuracy prior to leaving the patient care area.  7/20/2017 1:20 PM         Joel Barrera M.D.

## 2017-07-20 NOTE — LETTER
[address] [address]  [phone]        February 21, 2018    Ssuhma Marie  [address] [place] [address]              Dear Sushma Marie    This is to remind you that your non-fasting labs is due.    You may call our office at [phone] to schedule an appointment.    Please disregard this notice if you have already had your labs drawn or made an appointment.        Sincerely,        Joel Barrera MD

## 2017-07-20 NOTE — NURSING NOTE
"Chief Complaint   Patient presents with     Physical       Initial /62  Pulse 70  Wt 178 lb (80.7 kg)  LMP 1906  SpO2 99%  BMI 31.53 kg/m2 Estimated body mass index is 31.53 kg/(m^2) as calculated from the following:    Height as of 3/16/17: 5' 3\" (1.6 m).    Weight as of this encounter: 178 lb (80.7 kg).  BP completed using cuff size: regular        The following HM Due: NONE      The following patient reported/Care Every where data was sent to:  P ABSTRACT QUALITY INITIATIVES [[id number]]       Pt is having hair loss    China Dumont, NURIA                "

## 2017-07-25 NOTE — PROGRESS NOTES
Sushma, all your results are within acceptable limits  I recommend that you continue with the Lipitor (try not to miss dosages) and lets recheck this in 1 yr  Joel Barrera M.D.

## 2017-08-10 DIAGNOSIS — E78.5 HYPERLIPIDEMIA LDL GOAL <130: ICD-10-CM

## 2017-08-10 RX ORDER — ATORVASTATIN CALCIUM 20 MG/1
TABLET, FILM COATED ORAL
Qty: 30 TABLET | Refills: 10 | Status: SHIPPED | OUTPATIENT
Start: 2017-08-10 | End: 2017-09-12

## 2017-08-10 NOTE — TELEPHONE ENCOUNTER
Prescription approved per [facility] Refill Protocol.          Lipitor      Last Written Prescription Date: 7/20/17  Last Fill Quantity: 15, # refills: 0  Last Office Visit with [facility], [facility] or [facility] prescribing provider: 7/20/17       Lab Results   Component Value Date    CHOL 221 07/20/2017     Lab Results   Component Value Date    HDL 96 07/20/2017     Lab Results   Component Value Date    LDL 97 07/20/2017     Lab Results   Component Value Date    TRIG 142 07/20/2017     Lab Results   Component Value Date    CHOLHDLRATIO 2.2 04/06/2015

## 2017-09-12 DIAGNOSIS — E78.5 HYPERLIPIDEMIA LDL GOAL <130: ICD-10-CM

## 2017-09-12 RX ORDER — ATORVASTATIN CALCIUM 20 MG/1
20 TABLET, FILM COATED ORAL DAILY
Qty: 90 TABLET | Refills: 3 | Status: SHIPPED | OUTPATIENT
Start: 2017-09-12 | End: 2018-08-03

## 2017-09-12 NOTE — TELEPHONE ENCOUNTER
[facility] faxed us a form stating that the pt is requesting a 90 day supply of her Atorvastatin.     Prescription approved per [facility] Refill Protocol.

## 2017-09-19 ENCOUNTER — TRANSFERRED RECORDS (OUTPATIENT)
Dept: HEALTH INFORMATION MANAGEMENT | Facility: CLINIC | Age: 67
End: 2017-09-19

## 2017-10-17 ENCOUNTER — TELEPHONE (OUTPATIENT)
Dept: OBGYN | Facility: CLINIC | Age: 67
End: 2017-10-17

## 2017-10-17 NOTE — TELEPHONE ENCOUNTER
Call received from pt stating that she was trying to make an apt for her mammogram. States the order that was entered by Dr. Barrera was for a screening mammogram instead of a diagnostic mammogram which is what she thought was supposed to be ordered. Pt also asking that order for diagnostic mammogram be written for a 3D mammogram verses the standard 2D. States she is having diagnostic mammo due to family hx. Please advise.

## 2017-10-17 NOTE — TELEPHONE ENCOUNTER
I spoke with the patient about the criteria for a diagnostic mammogram versus a screening mammogram. Last exam from July of this year was normal and hence I ordered a screening exam. In the past that have been listed as a diagnostic because of a family history of maternal aunt with breast cancer.  Because of her normal exam  I did order a screening 3-D mammogram. The patient will have this done and if there are any concerns she'll give me a call

## 2017-10-24 ENCOUNTER — HOSPITAL ENCOUNTER (OUTPATIENT)
Dept: MAMMOGRAPHY | Facility: CLINIC | Age: 67
Discharge: HOME | End: 2017-10-24
Attending: OBSTETRICS & GYNECOLOGY | Admitting: OBSTETRICS & GYNECOLOGY
Payer: MEDICARE

## 2017-10-24 DIAGNOSIS — Z12.31 VISIT FOR SCREENING MAMMOGRAM: ICD-10-CM

## 2017-10-24 PROCEDURE — G0202 SCR MAMMO BI INCL CAD: HCPCS

## 2018-02-22 DIAGNOSIS — F43.21 ADJUSTMENT DISORDER WITH DEPRESSED MOOD: ICD-10-CM

## 2018-02-22 RX ORDER — VENLAFAXINE 75 MG/1
TABLET ORAL
Qty: 360 TABLET | Refills: 1 | Status: SHIPPED | OUTPATIENT
Start: 2018-02-22 | End: 2018-08-02

## 2018-02-22 NOTE — TELEPHONE ENCOUNTER
"Last Written Prescription Date:  7/13/17  Last Fill Quantity: 360,  # refills: 1   Last office visit: 3/16/2017 with prescribing provider:  3/16/17   Future Office Visit:      Requested Prescriptions   Pending Prescriptions Disp Refills     venlafaxine (EFFEXOR) 75 MG tablet [Pharmacy Med Name: VENLAFAXINE HCL 75 MG TABLET] 360 tablet 1     Sig: TAKE 2 TABLETS BY MOUTH 2 TIMES DAILY    Serotonin-Norepinephrine Reuptake Inhibitors  Failed    2/22/2018 11:34 AM       Failed - Normal serum creatinine on file in past 12 months    Recent Labs   Lab Test  05/16/16   1430   CR  0.71            Passed - Blood pressure under 140/90 in past 12 months    BP Readings from Last 3 Encounters:   07/20/17 118/62   03/16/17 122/72   01/13/17 136/82                Passed - Recent or future visit with authorizing provider's specialty    Patient had office visit in the last year or has a visit in the next 30 days with authorizing provider.  See \"Patient Info\" tab in inbasket, or \"Choose Columns\" in Meds & Orders section of the refill encounter.            Passed - Patient is age 18 or older       Passed - No active pregnancy on record       Passed - No positive pregnancy test in past 12 months        PHQ-9 SCORE 1/13/2017 2/9/2017 3/16/2017   Total Score - - -   Total Score MyChart - - -   Total Score 17 4 3     Routing refill request to provider for review/approval because:  Labs not current:  creatinine  OV every 6 months per [facility] protocol    Shannan Sanz RN   [facility] - Triage            "

## 2018-03-26 ENCOUNTER — TELEPHONE (OUTPATIENT)
Dept: LAB | Facility: CLINIC | Age: 68
End: 2018-03-26

## 2018-05-07 ENCOUNTER — OFFICE VISIT (OUTPATIENT)
Dept: FAMILY MEDICINE | Facility: CLINIC | Age: 68
End: 2018-05-07
Payer: COMMERCIAL

## 2018-05-07 VITALS
OXYGEN SATURATION: 97 % | SYSTOLIC BLOOD PRESSURE: 132 MMHG | RESPIRATION RATE: 16 BRPM | WEIGHT: 197 LBS | HEART RATE: 83 BPM | HEIGHT: 63 IN | BODY MASS INDEX: 34.91 KG/M2 | DIASTOLIC BLOOD PRESSURE: 87 MMHG | TEMPERATURE: 99.6 F

## 2018-05-07 DIAGNOSIS — R63.5 ABNORMAL WEIGHT GAIN: ICD-10-CM

## 2018-05-07 DIAGNOSIS — F43.21 ADJUSTMENT DISORDER WITH DEPRESSED MOOD: ICD-10-CM

## 2018-05-07 DIAGNOSIS — S89.92XA LEG INJURY, LEFT, INITIAL ENCOUNTER: Primary | ICD-10-CM

## 2018-05-07 PROCEDURE — 99214 OFFICE O/P EST MOD 30 MIN: CPT | Performed by: FAMILY MEDICINE

## 2018-05-07 RX ORDER — PHENTERMINE HYDROCHLORIDE 15 MG/1
15 CAPSULE ORAL EVERY MORNING
Qty: 30 CAPSULE | Refills: 0 | Status: SHIPPED | OUTPATIENT
Start: 2018-05-07 | End: 2018-11-18

## 2018-05-07 RX ORDER — VENLAFAXINE HYDROCHLORIDE 150 MG/1
150 CAPSULE, EXTENDED RELEASE ORAL DAILY
Qty: 90 CAPSULE | Refills: 1 | Status: SHIPPED | OUTPATIENT
Start: 2018-05-07 | End: 2019-01-17

## 2018-05-07 ASSESSMENT — ANXIETY QUESTIONNAIRES
2. NOT BEING ABLE TO STOP OR CONTROL WORRYING: SEVERAL DAYS
3. WORRYING TOO MUCH ABOUT DIFFERENT THINGS: SEVERAL DAYS
GAD7 TOTAL SCORE: 7
6. BECOMING EASILY ANNOYED OR IRRITABLE: SEVERAL DAYS
5. BEING SO RESTLESS THAT IT IS HARD TO SIT STILL: SEVERAL DAYS
7. FEELING AFRAID AS IF SOMETHING AWFUL MIGHT HAPPEN: SEVERAL DAYS
1. FEELING NERVOUS, ANXIOUS, OR ON EDGE: SEVERAL DAYS
IF YOU CHECKED OFF ANY PROBLEMS ON THIS QUESTIONNAIRE, HOW DIFFICULT HAVE THESE PROBLEMS MADE IT FOR YOU TO DO YOUR WORK, TAKE CARE OF THINGS AT HOME, OR GET ALONG WITH OTHER PEOPLE: SOMEWHAT DIFFICULT

## 2018-05-07 ASSESSMENT — PATIENT HEALTH QUESTIONNAIRE - PHQ9: 5. POOR APPETITE OR OVEREATING: SEVERAL DAYS

## 2018-05-07 NOTE — PROGRESS NOTES
SUBJECTIVE:   Sushma Marie is a 67 year old female who presents to clinic today for the following health issues:      Musculoskeletal problem/pain      Duration: 6 days     Description  Location: left foot     Intensity:  moderate    Accompanying signs and symptoms: tingling, swelling and bruising     History  Previous similar problem: no   Previous evaluation:  none    Precipitating or alleviating factors:  Trauma or overuse: YES- fall   Aggravating factors include: walking    Therapies tried and outcome: ice, Ibuprofen, elevating       Problem list and histories reviewed & adjusted, as indicated.  Additional history: as documented    Patient Active Problem List   Diagnosis     Osteoporosis     COPD (chronic obstructive pulmonary disease) (H)     Dyslipidemia     HYPERLIPIDEMIA LDL GOAL <130     Adjustment disorder with depressed mood     Advanced directives, counseling/discussion     Past Surgical History:   Procedure Laterality Date     HC REMOVE TONSILS/ADENOIDS,<13 Y/O         Social History   Substance Use Topics     Smoking status: Former Smoker     Smokeless tobacco: Never Used      Comment: quit 12 years ago     Alcohol use Yes      Comment: social     Family History   Problem Relation Age of Onset     CANCER Mother       stomach cancer     Respiratory Father       emphysema and dementia     Family History Negative Brother       drug overdose     Family History Negative Sister      Family History Negative Sister       from status asthmaticus     HEART DISEASE Maternal Grandmother       stroke     HEART DISEASE Maternal Grandfather       MI     Anesthesia Reaction Daughter      Thyroid Disease Son      Asthma Child      OSTEOPOROSIS Maternal Aunt      Breast Cancer Maternal Aunt          Current Outpatient Prescriptions   Medication Sig Dispense Refill     phentermine 15 MG capsule Take 1 capsule (15 mg) by mouth every morning 30 capsule 0     atorvastatin  "(LIPITOR) 20 MG tablet Take 1 tablet (20 mg) by mouth daily 90 tablet 3     IBANdronate (BONIVA) 150 MG tablet Take 1 tablet (150 mg) by mouth every 30 days 12 tablet 1     PROAIR  (90 BASE) MCG/ACT IN AERS INHALE 1 TO 2 PUFFS EVERY 4 TO 6 HOURS AS NEEDED 1 0     SPIRIVA HANDIHALER 18 MCG IN CAPS 1 CAPSULE DAILY       SYMBICORT 80-4.5 MCG/ACT IN AERO two puffs bid       venlafaxine (EFFEXOR) 75 MG tablet TAKE 2 TABLETS BY MOUTH 2 TIMES DAILY 360 tablet 1     venlafaxine (EFFEXOR-XR) 150 MG 24 hr capsule Take 1 capsule (150 mg) by mouth daily 90 capsule 1     Allergies   Allergen Reactions     Sulfa [Sulfacetamide Sodium] GI Disturbance     Recent Labs   Lab Test  07/20/17   1453  05/16/16   1430  04/06/15   1008  02/13/14   1433   LDL  97  84  73  72   HDL  96  69  75  66   TRIG  142  172*  82  59   ALT   --   26  27  32   CR   --   0.71  0.68  0.59   GFRESTIMATED   --   82  87  >90   GFRESTBLACK   --   >90   GFR Calc    >90   GFR Calc    >90   POTASSIUM   --   3.9  4.2  4.5      BP Readings from Last 3 Encounters:   05/07/18 132/87   07/20/17 118/62   03/16/17 122/72    Wt Readings from Last 3 Encounters:   05/07/18 197 lb (89.4 kg)   07/20/17 178 lb (80.7 kg)   03/16/17 181 lb (82.1 kg)           Reviewed and updated as needed this visit by clinical staff       Reviewed and updated as needed this visit by Provider         ROS:  Constitutional, HEENT, cardiovascular, pulmonary, gi and gu systems are negative, except as otherwise noted.    OBJECTIVE:     /87 (Cuff Size: Adult Large)  Pulse 83  Temp 99.6  F (37.6  C) (Tympanic)  Resp 16  Ht 5' 3\" (1.6 m)  Wt 197 lb (89.4 kg)  LMP 02/06/1906  SpO2 97%  BMI 34.9 kg/m2  Body mass index is 34.9 kg/(m^2).  GENERAL: healthy, alert and no distress  EYES: Eyes grossly normal to inspection, PERRL and conjunctivae and sclerae normal  NECK: no adenopathy, no asymmetry, masses, or scars and thyroid normal to palpation  RESP: " lungs clear to auscultation - no rales, rhonchi or wheezes  CV: regular rate and rhythm, normal S1 S2, no S3 or S4, no murmur, click or rub, no peripheral edema and peripheral pulses strong  ABDOMEN: soft, nontender, no hepatosplenomegaly, no masses and bowel sounds normal  MS: no gross musculoskeletal defects noted, no edema  SKIN: no suspicious lesions or rashes  NEURO: Normal strength and tone, mentation intact and speech normal  BACK: no CVA tenderness, no paralumbar tenderness  PSYCH: mentation appears normal, affect normal/bright  LYMPH: no cervical, supraclavicular, axillary, or inguinal adenopathy        ASSESSMENT/PLAN:   ASSESSMENT / PLAN:  (S89.92XA) Leg injury, left, initial encounter  (primary encounter diagnosis)  Comment: sprained left ankle with shin, has mild bruising and swelling, no pain with wt bearing, will have her to keep doing conservative management   Plan: mentioned above     (F43.21) Adjustment disorder with depressed mood  Comment: has been stable with current dose, has no HI/SI, will keep watching sx   Plan: DEPRESSION ACTION PLAN (DAP), venlafaxine         (EFFEXOR-XR) 150 MG 24 hr capsule            (R63.5) Abnormal weight gain  Comment: has been worsening during winter, will have her to try another course of phentermine for next 2- 3 months   Plan: phentermine 15 MG capsule            FUTURE APPOINTMENTS:       - Follow-up visit in 6 months     Manjit Melgoza MD  [facility] [place]

## 2018-05-07 NOTE — MR AVS SNAPSHOT
After Visit Summary   5/7/2018    Sushma Marie    MRN: [id number]           Patient Information     Date Of Birth          1950        Visit Information        Provider Department      5/7/2018 1:40 PM Manjit Melgoza MD [facility] [place]        Today's Diagnoses     Leg injury, left, initial encounter    -  1    Adjustment disorder with depressed mood        Abnormal weight gain           Follow-ups after your visit        Follow-up notes from your care team     Return in about 6 months (around 11/7/2018) for Physical Exam.      Who to contact     If you have questions or need follow up information about today's clinic visit or your schedule please contact [facility] [place] directly at [phone].  Normal or non-critical lab and imaging results will be communicated to you by Unifysquarehart, letter or phone within 4 business days after the clinic has received the results. If you do not hear from us within 7 days, please contact the clinic through Unifysquarehart or phone. If you have a critical or abnormal lab result, we will notify you by phone as soon as possible.  Submit refill requests through Image Insight or call your pharmacy and they will forward the refill request to us. Please allow 3 business days for your refill to be completed.          Additional Information About Your Visit        MyChart Information     Image Insight gives you secure access to your electronic health record. If you see a primary care provider, you can also send messages to your care team and make appointments. If you have questions, please call your primary care clinic.  If you do not have a primary care provider, please call [phone] and they will assist you.        Care EveryWhere ID     This is your Care EveryWhere ID. This could be used by other organizations to access your [place] medical records  [id number]        Your Vitals Were     Pulse Temperature Respirations Height Last Period Pulse Oximetry    83  "99.6  F (37.6  C) (Tympanic) 16 5' 3\" (1.6 m) 02/06/1906 97%    BMI (Body Mass Index)                   34.9 kg/m2            Blood Pressure from Last 3 Encounters:   05/07/18 132/87   07/20/17 118/62   03/16/17 122/72    Weight from Last 3 Encounters:   05/07/18 197 lb (89.4 kg)   07/20/17 178 lb (80.7 kg)   03/16/17 181 lb (82.1 kg)              We Performed the Following     DEPRESSION ACTION PLAN (DAP)          Today's Medication Changes          These changes are accurate as of 5/7/18  2:11 PM.  If you have any questions, ask your nurse or doctor.               Start taking these medicines.        Dose/Directions    phentermine 15 MG capsule   Used for:  Abnormal weight gain   Started by:  Manjit Melgoza MD        Dose:  15 mg   Take 1 capsule (15 mg) by mouth every morning   Quantity:  30 capsule   Refills:  0         These medicines have changed or have updated prescriptions.        Dose/Directions    * venlafaxine 75 MG tablet   Commonly known as:  EFFEXOR   This may have changed:  Another medication with the same name was added. Make sure you understand how and when to take each.   Used for:  Adjustment disorder with depressed mood   Changed by:  Manjit Melgoza MD        TAKE 2 TABLETS BY MOUTH 2 TIMES DAILY   Quantity:  360 tablet   Refills:  1       * venlafaxine 150 MG 24 hr capsule   Commonly known as:  EFFEXOR-XR   This may have changed:  You were already taking a medication with the same name, and this prescription was added. Make sure you understand how and when to take each.   Used for:  Adjustment disorder with depressed mood   Changed by:  Manjit Melgoza MD        Dose:  150 mg   Take 1 capsule (150 mg) by mouth daily   Quantity:  90 capsule   Refills:  1       * Notice:  This list has 2 medication(s) that are the same as other medications prescribed for you. Read the directions carefully, and ask your doctor or other care provider to review them with you.         Where to get your medicines      These " medications were sent to [id number] - [place], [place] - [address]  [address] [place] [address]     Phone:  [phone]     venlafaxine 150 MG 24 hr capsule         Some of these will need a paper prescription and others can be bought over the counter.  Ask your nurse if you have questions.     Bring a paper prescription for each of these medications     phentermine 15 MG capsule                Primary Care Provider Office Phone # Fax #    Manjit Melgoza [phone]       [address] [facility] [address]        Equal Access to Services     [facility]: Hadii aad ku hadasho Soomaali, waaxda luqadaha, qaybta kaalmada adeegyada, alban mcmillan . So [facility] [phone].    ATENCIÓN: Si habla español, tiene a cottrell disposición servicios gratuitos de asistencia lingüística. Ll[facility] [phone].    We comply with applicable federal civil rights laws and [place] laws. We do not discriminate on the basis of race, color, national origin, age, disability, sex, sexual orientation, or gender identity.            Thank you!     Thank you for choosing [facility]  for your care. Our goal is always to provide you with excellent care. Hearing back from our patients is one way we can continue to improve our services. Please take a few minutes to complete the written survey that you may receive in the mail after your visit with us. Thank you!             Your Updated Medication List - Protect others around you: Learn how to safely use, store and throw away your medicines at www.disposemymeds.org.          This list is accurate as of 5/7/18  2:11 PM.  Always use your most recent med list.                   Brand Name Dispense Instructions for use Diagnosis    atorvastatin 20 MG tablet    LIPITOR    90 tablet    Take 1 tablet (20 mg) by mouth daily    Hyperlipidemia LDL goal <130       IBANdronate 150 MG tablet    BONIVA    12 tablet    Take 1  tablet (150 mg) by mouth every 30 days    Age-related osteoporosis without current pathological fracture       phentermine 15 MG capsule     30 capsule    Take 1 capsule (15 mg) by mouth every morning    Abnormal weight gain       PROAIR  (90 Base) MCG/ACT Inhaler   Generic drug:  albuterol     1    INHALE 1 TO 2 PUFFS EVERY 4 TO 6 HOURS AS NEEDED    Mild intermittent asthma       SPIRIVA HANDIHALER 18 MCG capsule   Generic drug:  tiotropium      1 CAPSULE DAILY        SYMBICORT 80-4.5 MCG/ACT Inhaler   Generic drug:  budesonide-formoterol      two puffs bid        * venlafaxine 75 MG tablet    EFFEXOR    360 tablet    TAKE 2 TABLETS BY MOUTH 2 TIMES DAILY    Adjustment disorder with depressed mood       * venlafaxine 150 MG 24 hr capsule    EFFEXOR-XR    90 capsule    Take 1 capsule (150 mg) by mouth daily    Adjustment disorder with depressed mood       * Notice:  This list has 2 medication(s) that are the same as other medications prescribed for you. Read the directions carefully, and ask your doctor or other care provider to review them with you.

## 2018-05-08 ASSESSMENT — PATIENT HEALTH QUESTIONNAIRE - PHQ9: SUM OF ALL RESPONSES TO PHQ QUESTIONS 1-9: 13

## 2018-05-08 ASSESSMENT — ANXIETY QUESTIONNAIRES: GAD7 TOTAL SCORE: 7

## 2018-05-10 ENCOUNTER — OFFICE VISIT (OUTPATIENT)
Dept: FAMILY MEDICINE | Facility: CLINIC | Age: 68
End: 2018-05-10
Payer: COMMERCIAL

## 2018-05-10 VITALS
TEMPERATURE: 98.8 F | DIASTOLIC BLOOD PRESSURE: 90 MMHG | BODY MASS INDEX: 34.72 KG/M2 | SYSTOLIC BLOOD PRESSURE: 160 MMHG | OXYGEN SATURATION: 98 % | HEART RATE: 85 BPM | WEIGHT: 196 LBS

## 2018-05-10 DIAGNOSIS — H61.23 BILATERAL IMPACTED CERUMEN: ICD-10-CM

## 2018-05-10 DIAGNOSIS — R22.9 SKIN NODULE: Primary | ICD-10-CM

## 2018-05-10 DIAGNOSIS — R22.1 LOCALIZED SWELLING, MASS AND LUMP, NECK: ICD-10-CM

## 2018-05-10 PROCEDURE — 69210 REMOVE IMPACTED EAR WAX UNI: CPT | Mod: RT | Performed by: INTERNAL MEDICINE

## 2018-05-10 PROCEDURE — 69209 REMOVE IMPACTED EAR WAX UNI: CPT | Mod: 59 | Performed by: INTERNAL MEDICINE

## 2018-05-10 PROCEDURE — 99214 OFFICE O/P EST MOD 30 MIN: CPT | Mod: 25 | Performed by: INTERNAL MEDICINE

## 2018-05-10 NOTE — PROGRESS NOTES
SUBJECTIVE:   Sushma Marie is a 67 year old female who presents to clinic today for the following health issues:    Sushma is here with a swelling near her right jaw and right upper thigh.  Yesterday had swelling just under the jaw/ear on the right.  It was tender, not red, felt like her jaw was catching.  It is much better today, just a little more swollen than the left side.  Her ear doesn't feel quite right also.      Today she noticed a tender spot on her right upper thigh, no overlying redness.     She denies recent illnesses, no fevers.  No unilateral facial symptoms, like tingling or eyelid drooping. She got back from a 2-day trip to [facility] a week and a half ago. She is heading out of town for a few days on Sunday to [place] (today is Thursday).           Reviewed and updated as needed this visit by clinical staff  Tobacco  Allergies  Meds       Reviewed and updated as needed this visit by Provider         ROS:  Const, HENT, neuro, skin reviewed,  otherwise negative unless noted above.       OBJECTIVE:     /90 (BP Location: Left arm, Patient Position: Chair, Cuff Size: Adult Regular)  Pulse 85  Temp 98.8  F (37.1  C) (Tympanic)  Wt 196 lb (88.9 kg)  LMP 02/06/1906  SpO2 98%  BMI 34.72 kg/m2  Body mass index is 34.72 kg/(m^2).    Gen: pleasant, well appearing woman, no distress  HENT: oropharynx clear, no oral lesions.  ears occluded by wax b/l.  They were irrigated by nursing with some improvement.  I then used a curette to remove a large amount of wax from the right ear to reveal normal TM, small amount of dead skin removed from left ear, to reveal normal TM  Neck: subtle fullness just below angle of jaw on the right, no discreet masses, nontender, no overlying redness.   Ext: right upper thigh with subcutaneous nodule/scar but area of tenderness is next to the nodule.  There is no bruising or erythema.         ASSESSMENT/PLAN:       ICD-10-CM    1. Skin nodule R22.9 CANCELED: US  Extremity Non Vascular Right   2. Localized swelling, mass and lump, neck R22.1 CANCELED: US Head Neck Soft Tissue   3. Bilateral impacted cerumen H61.23 REMOVE IMPACTED CERUMEN     I am unsure what either swelling is, and I do not know that they are part of the same process.  The neck swelling is much better today.  The tender spot on her leg does not really correspond to the nodule.  Considered getting an ultrasound since she is going out of town, but we agreed to closely monitor her symptoms for now.  She will let me know if things get worse or have not improved when she gets back.        Follow up pending resolution of symptoms.       Asya Hughes MD  [facility] [place]

## 2018-05-10 NOTE — MR AVS SNAPSHOT
After Visit Summary   5/10/2018    Sushma Marie    MRN: [id number]           Patient Information     Date Of Birth          1950        Visit Information        Provider Department      5/10/2018 11:20 AM Asya Hughes MD [facility] [place]        Today's Diagnoses     Skin nodule    -  1    Localized swelling, mass and lump, neck        Bilateral impacted cerumen           Follow-ups after your visit        Follow-up notes from your care team     Return in 1 week (on 5/17/2018) for if not improving .      Your next 10 appointments already scheduled     Jul 23, 2018 10:30 AM CDT   PHYSICAL with oJel Barrera MD   [facility]    [address]  [facility] [address] [phone]              Who to contact     If you have questions or need follow up information about today's clinic visit or your schedule please contact [facility] directly at [phone].  Normal or non-critical lab and imaging results will be communicated to you by Forsakehart, letter or phone within 4 business days after the clinic has received the results. If you do not hear from us within 7 days, please contact the clinic through CosmosIDt or phone. If you have a critical or abnormal lab result, we will notify you by phone as soon as possible.  Submit refill requests through Payteller or call your pharmacy and they will forward the refill request to us. Please allow 3 business days for your refill to be completed.          Additional Information About Your Visit        MyChart Information     Payteller gives you secure access to your electronic health record. If you see a primary care provider, you can also send messages to your care team and make appointments. If you have questions, please call your primary care clinic.  If you do not have a primary care provider, please call [phone] and they will assist you.        Care EveryWhere ID      This is your Care EveryWhere ID. This could be used by other organizations to access your [place] medical records  [id number]        Your Vitals Were     Pulse Temperature Last Period Pulse Oximetry BMI (Body Mass Index)       85 98.8  F (37.1  C) (Tympanic) 02/06/1906 98% 34.72 kg/m2        Blood Pressure from Last 3 Encounters:   05/10/18 160/90   05/07/18 132/87   07/20/17 118/62    Weight from Last 3 Encounters:   05/10/18 196 lb (88.9 kg)   05/07/18 197 lb (89.4 kg)   07/20/17 178 lb (80.7 kg)              We Performed the Following     REMOVE IMPACTED [facility]        Primary Care Provider Office Phone # Fax #    Manjit Melgoza [phone]       [address] [facility] [address]        Equal Access to Services     [facility]: Hadii aad ku hadasho Sodilip, waaxda luqadaha, qaybta kaalmada adeegyada, alban mcmillan . So [facility] [place] [phone].    ATENCIÓN: Si habla español, tiene a cottrell disposición servicios gratuitos de asistencia lingüística. Llame al [phone].    We comply with applicable federal civil rights laws and [place] laws. We do not discriminate on the basis of race, color, national origin, age, disability, sex, sexual orientation, or gender identity.            Thank you!     Thank you for choosing [facility]  for your care. Our goal is always to provide you with excellent care. Hearing back from our patients is one way we can continue to improve our services. Please take a few minutes to complete the written survey that you may receive in the mail after your visit with us. Thank you!             Your Updated Medication List - Protect others around you: Learn how to safely use, store and throw away your medicines at www.disposemymeds.org.          This list is accurate as of 5/10/18  1:31 PM.  Always use your most recent med list.                   Brand Name Dispense Instructions for use Diagnosis    atorvastatin 20 MG  tablet    LIPITOR    90 tablet    Take 1 tablet (20 mg) by mouth daily    Hyperlipidemia LDL goal <130       IBANdronate 150 MG tablet    BONIVA    12 tablet    Take 1 tablet (150 mg) by mouth every 30 days    Age-related osteoporosis without current pathological fracture       phentermine 15 MG capsule     30 capsule    Take 1 capsule (15 mg) by mouth every morning    Abnormal weight gain       PROAIR  (90 Base) MCG/ACT Inhaler   Generic drug:  albuterol     1    INHALE 1 TO 2 PUFFS EVERY 4 TO 6 HOURS AS NEEDED    Mild intermittent asthma       SPIRIVA HANDIHALER 18 MCG capsule   Generic drug:  tiotropium      1 CAPSULE DAILY        SYMBICORT 80-4.5 MCG/ACT Inhaler   Generic drug:  budesonide-formoterol      two puffs bid        * venlafaxine 75 MG tablet    EFFEXOR    360 tablet    TAKE 2 TABLETS BY MOUTH 2 TIMES DAILY    Adjustment disorder with depressed mood       * venlafaxine 150 MG 24 hr capsule    EFFEXOR-XR    90 capsule    Take 1 capsule (150 mg) by mouth daily    Adjustment disorder with depressed mood       * Notice:  This list has 2 medication(s) that are the same as other medications prescribed for you. Read the directions carefully, and ask your doctor or other care provider to review them with you.

## 2018-06-12 ENCOUNTER — TRANSFERRED RECORDS (OUTPATIENT)
Dept: HEALTH INFORMATION MANAGEMENT | Facility: CLINIC | Age: 68
End: 2018-06-12

## 2018-07-02 ENCOUNTER — TRANSFERRED RECORDS (OUTPATIENT)
Dept: HEALTH INFORMATION MANAGEMENT | Facility: CLINIC | Age: 68
End: 2018-07-02

## 2018-08-02 ENCOUNTER — OFFICE VISIT (OUTPATIENT)
Dept: OBGYN | Facility: CLINIC | Age: 68
End: 2018-08-02
Payer: COMMERCIAL

## 2018-08-02 VITALS — SYSTOLIC BLOOD PRESSURE: 140 MMHG | WEIGHT: 192 LBS | BODY MASS INDEX: 34.01 KG/M2 | DIASTOLIC BLOOD PRESSURE: 88 MMHG

## 2018-08-02 DIAGNOSIS — E78.5 DYSLIPIDEMIA: ICD-10-CM

## 2018-08-02 DIAGNOSIS — M81.0 AGE-RELATED OSTEOPOROSIS WITHOUT CURRENT PATHOLOGICAL FRACTURE: Primary | ICD-10-CM

## 2018-08-02 DIAGNOSIS — Z01.411 ENCOUNTER FOR GYNECOLOGICAL EXAMINATION WITH ABNORMAL FINDING: ICD-10-CM

## 2018-08-02 DIAGNOSIS — E78.5 HYPERLIPIDEMIA LDL GOAL <130: ICD-10-CM

## 2018-08-02 LAB
ALBUMIN SERPL-MCNC: 4 G/DL (ref 3.4–5)
ALBUMIN UR-MCNC: NEGATIVE MG/DL
ALP SERPL-CCNC: 97 U/L (ref 40–150)
ALT SERPL W P-5'-P-CCNC: 32 U/L (ref 0–50)
ANION GAP SERPL CALCULATED.3IONS-SCNC: 8 MMOL/L (ref 3–14)
APPEARANCE UR: CLEAR
AST SERPL W P-5'-P-CCNC: 25 U/L (ref 0–45)
BILIRUB SERPL-MCNC: 0.5 MG/DL (ref 0.2–1.3)
BILIRUB UR QL STRIP: NEGATIVE
BUN SERPL-MCNC: 13 MG/DL (ref 7–30)
CALCIUM SERPL-MCNC: 8.8 MG/DL (ref 8.5–10.1)
CHLORIDE SERPL-SCNC: 106 MMOL/L (ref 94–109)
CHOLEST SERPL-MCNC: 198 MG/DL
CO2 SERPL-SCNC: 26 MMOL/L (ref 20–32)
COLOR UR AUTO: YELLOW
CREAT SERPL-MCNC: 0.74 MG/DL (ref 0.52–1.04)
GFR SERPL CREATININE-BSD FRML MDRD: 78 ML/MIN/1.7M2
GLUCOSE SERPL-MCNC: 89 MG/DL (ref 70–99)
GLUCOSE UR STRIP-MCNC: NEGATIVE MG/DL
HDLC SERPL-MCNC: 73 MG/DL
HGB UR QL STRIP: ABNORMAL
KETONES UR STRIP-MCNC: NEGATIVE MG/DL
LDLC SERPL CALC-MCNC: 99 MG/DL
LEUKOCYTE ESTERASE UR QL STRIP: NEGATIVE
NITRATE UR QL: NEGATIVE
NON-SQ EPI CELLS #/AREA URNS LPF: NORMAL /LPF
NONHDLC SERPL-MCNC: 125 MG/DL
PH UR STRIP: 5 PH (ref 5–7)
POTASSIUM SERPL-SCNC: 4 MMOL/L (ref 3.4–5.3)
PROT SERPL-MCNC: 7.2 G/DL (ref 6.8–8.8)
RBC #/AREA URNS AUTO: NORMAL /HPF
SODIUM SERPL-SCNC: 140 MMOL/L (ref 133–144)
SOURCE: ABNORMAL
SP GR UR STRIP: 1.02 (ref 1–1.03)
TRIGL SERPL-MCNC: 131 MG/DL
UROBILINOGEN UR STRIP-ACNC: 0.2 EU/DL (ref 0.2–1)
WBC #/AREA URNS AUTO: NORMAL /HPF

## 2018-08-02 PROCEDURE — 36415 COLL VENOUS BLD VENIPUNCTURE: CPT | Performed by: OBSTETRICS & GYNECOLOGY

## 2018-08-02 PROCEDURE — 81001 URINALYSIS AUTO W/SCOPE: CPT | Performed by: OBSTETRICS & GYNECOLOGY

## 2018-08-02 PROCEDURE — 99397 PER PM REEVAL EST PAT 65+ YR: CPT | Performed by: OBSTETRICS & GYNECOLOGY

## 2018-08-02 PROCEDURE — 82306 VITAMIN D 25 HYDROXY: CPT | Performed by: OBSTETRICS & GYNECOLOGY

## 2018-08-02 PROCEDURE — 80053 COMPREHEN METABOLIC PANEL: CPT | Performed by: OBSTETRICS & GYNECOLOGY

## 2018-08-02 PROCEDURE — 80061 LIPID PANEL: CPT | Performed by: OBSTETRICS & GYNECOLOGY

## 2018-08-02 NOTE — NURSING NOTE
"Chief Complaint   Patient presents with     Physical       Initial /88  Wt 192 lb (87.1 kg)  LMP 1906  BMI 34.01 kg/m2 Estimated body mass index is 34.01 kg/(m^2) as calculated from the following:    Height as of 18: 5' 3\" (1.6 m).    Weight as of this encounter: 192 lb (87.1 kg).  BP completed using cuff size: regular        The following HM Due: NONE      The following patient reported/Care Every where data was sent to:  P ABSTRACT QUALITY INITIATIVES [[id number]]        China Dumont CMA                 "

## 2018-08-02 NOTE — MR AVS SNAPSHOT
After Visit Summary   8/2/2018    Sushma Marie    MRN: [id number]           Patient Information     Date Of Birth          1950        Visit Information        Provider Department      8/2/2018 3:00 PM Joel Barrera MD [facility]        Today's Diagnoses     Age-related osteoporosis without current pathological fracture    -  1    Dyslipidemia        Hyperlipidemia LDL goal <130        Encounter for gynecological examination with abnormal finding          Care Instructions    You can reach your [facility] Care Team any time of the day by calling [phone]. This number will put you in touch with the 24 hour nurse line if the clinic is closed.    To contact your OB/GYN Surgery Scheduler please call [phone]. This is a direct number for your care team between 8 a.m. and 4 p.m. Monday through Friday.    [facility] is open for your convenience: [phone]  Monday through Friday 8 a.m. to 8:30 p.m.  Saturday 9 a.m. to 6 p.m.  Sunday Noon to 6 p.m.    They are closed on all major holidays.              Follow-ups after your visit        Follow-up notes from your care team     Return in about 1 year (around 8/2/2019) for Routine Visit.      Who to contact     If you have questions or need follow up information about today's clinic visit or your schedule please contact [facility] directly at [phone].  Normal or non-critical lab and imaging results will be communicated to you by MyChart, letter or phone within 4 business days after the clinic has received the results. If you do not hear from us within 7 days, please contact the clinic through MyChart or phone. If you have a critical or abnormal lab result, we will notify you by phone as soon as possible.  Submit refill requests through [employer] or call your pharmacy and they will forward the refill request to us. Please allow 3 business days for your refill to be completed.           Additional Information About Your Visit        MyChart Information     [employer] gives you secure access to your electronic health record. If you see a primary care provider, you can also send messages to your care team and make appointments. If you have questions, please call your primary care clinic.  If you do not have a primary care provider, please call [phone] and they will assist you.        Care EveryWhere ID     This is your Care EveryWhere ID. This could be used by other organizations to access your [facility] medical records  [id number]        Your Vitals Were     Last Period BMI (Body Mass Index)                02/06/1906 34.01 kg/m2           Blood Pressure from Last 3 Encounters:   08/02/18 140/88   05/10/18 160/90   05/07/18 132/87    Weight from Last 3 Encounters:   08/02/18 192 lb (87.1 kg)   05/10/18 196 lb (88.9 kg)   05/07/18 197 lb (89.4 kg)              We Performed the Following     Comprehensive metabolic panel (BMP + Alb, Alk Phos, ALT, AST, Total. Bili, TP)     Lipid panel reflex to direct LDL Fasting     UA reflex to Microscopic and Culture     Urine Microscopic     Vitamin D Deficiency          Where to get your medicines      These medications were sent to [place] [address] [place], [place] - [address]  [address] [facility] [address]     Phone:  [phone]     atorvastatin 20 MG tablet          Primary Care Provider Office Phone # Fax #    Manjit FERNANDEZ Melgoza [phone]       [address] [place] [address]        Equal Access to Services     RANDALL GIBSON AH: Hadii aad ku hadasho Soomaali, waaxda luqadaha, qaybta kaalmada adeegyada, alban dixon. So [facility] [phone].    ATENCIÓN: Si habla español, tiene a cottrell disposición servicios gratuitos de asistencia lingüística. Llame al [phone].    We comply with applicable federal civil rights laws and [place] laws. We do not discriminate on the basis of race, color,  national origin, age, disability, sex, sexual orientation, or gender identity.            Thank you!     Thank you for choosing [facility]  for your care. Our goal is always to provide you with excellent care. Hearing back from our patients is one way we can continue to improve our services. Please take a few minutes to complete the written survey that you may receive in the mail after your visit with us. Thank you!             Your Updated Medication List - Protect others around you: Learn how to safely use, store and throw away your medicines at www.disposemymeds.org.          This list is accurate as of 8/2/18 11:59 PM.  Always use your most recent med list.                   Brand Name Dispense Instructions for use Diagnosis    atorvastatin 20 MG tablet    LIPITOR    90 tablet    Take 1 tablet (20 mg) by mouth daily    Hyperlipidemia LDL goal <130       IBANdronate 150 MG tablet    BONIVA    12 tablet    Take 1 tablet (150 mg) by mouth every 30 days    Age-related osteoporosis without current pathological fracture       phentermine 15 MG capsule     30 capsule    Take 1 capsule (15 mg) by mouth every morning    Abnormal weight gain       PROAIR  (90 Base) MCG/ACT Inhaler   Generic drug:  albuterol     1    INHALE 1 TO 2 PUFFS EVERY 4 TO 6 HOURS AS NEEDED    Mild intermittent asthma       SPIRIVA HANDIHALER 18 MCG capsule   Generic drug:  tiotropium      1 CAPSULE DAILY        SYMBICORT 80-4.5 MCG/ACT Inhaler   Generic drug:  budesonide-formoterol      two puffs bid        venlafaxine 150 MG 24 hr capsule    EFFEXOR-XR    90 capsule    Take 1 capsule (150 mg) by mouth daily    Adjustment disorder with depressed mood

## 2018-08-03 RX ORDER — ATORVASTATIN CALCIUM 20 MG/1
20 TABLET, FILM COATED ORAL DAILY
Qty: 90 TABLET | Refills: 3 | Status: SHIPPED | OUTPATIENT
Start: 2018-08-03 | End: 2019-09-09

## 2018-08-03 NOTE — PROGRESS NOTES
Sushma, your cholesterol, liver enzyme panel and urinalysis results are within normal limits.  The vitamin D level is still pending.  I sent a refill for Lipitor into the pharmacy for a year.  Please let me know if you have any questions  Joel Barrera M.D.

## 2018-08-05 LAB — DEPRECATED CALCIDIOL+CALCIFEROL SERPL-MC: 33 UG/L (ref 20–75)

## 2018-08-06 NOTE — PROGRESS NOTES
HPI:  Sushma Marie is a 68 year old white female  ,menopause for contraception who presents for an annual exam and pap.  She is otherwise doing well.  I reviewed her previous lipid panels.  I also discussed her osteoporosis and treatment recommendations.  Self breast exam,  ACS screening mammogram recs, the use of 81 mg ASA to decrease the risk of heart disease, lipid screening, colon cancer screening recs and Dexa scan recs thoroughly reveiwed.      Past Medical History:   Diagnosis Date     COPD (chronic obstructive pulmonary disease) (H)      Hyperlipidemia      Mild intermittent asthma     Exercise induced     Osteoporosis, unspecified      Past Surgical History:   Procedure Laterality Date     HC REMOVE TONSILS/ADENOIDS,<13 Y/O       Family History   Problem Relation Age of Onset     Cancer Mother       stomach cancer     Respiratory Father       emphysema and dementia     Family History Negative Brother       drug overdose     Family History Negative Sister      Family History Negative Sister       from status asthmaticus     HEART DISEASE Maternal Grandmother       stroke     HEART DISEASE Maternal Grandfather       MI     Anesthesia Reaction Daughter      Thyroid Disease Son      Asthma Child      Osteoperosis Maternal Aunt      Breast Cancer Maternal Aunt      Social History     Social History     Marital status:      Spouse name: Darwin     Number of children: 2     Years of education: 16     Occupational History           [employer]     Social History Main Topics     Smoking status: Former Smoker     Smokeless tobacco: Never Used      Comment: quit 12 years ago     Alcohol use Yes      Comment: social     Drug use: No     Sexual activity: Yes     Partners: Male     Birth control/ protection: Post-menopausal     Other Topics Concern     Not on file     Social History Narrative       Allergies:  Sulfa [sulfacetamide  sodium]    Current Outpatient Prescriptions   Medication Sig Dispense Refill     atorvastatin (LIPITOR) 20 MG tablet Take 1 tablet (20 mg) by mouth daily 90 tablet 3     IBANdronate (BONIVA) 150 MG tablet Take 1 tablet (150 mg) by mouth every 30 days 12 tablet 1     phentermine 15 MG capsule Take 1 capsule (15 mg) by mouth every morning 30 capsule 0     PROAIR  (90 BASE) MCG/ACT IN AERS INHALE 1 TO 2 PUFFS EVERY 4 TO 6 HOURS AS NEEDED 1 0     SPIRIVA HANDIHALER 18 MCG IN CAPS 1 CAPSULE DAILY       SYMBICORT 80-4.5 MCG/ACT IN AERO two puffs bid       venlafaxine (EFFEXOR-XR) 150 MG 24 hr capsule Take 1 capsule (150 mg) by mouth daily 90 capsule 1       ROS: ROS: 10 point ROS neg other than the symptoms noted above in the HPI.    EXAM:  Vitals: /88  Wt 192 lb (87.1 kg)  LMP 02/06/1906  BMI 34.01 kg/m2  BMI= Body mass index is 34.01 kg/(m^2).  Constitutional: healthy, alert and no distress  Head: Normocephalic. No masses, lesions, tenderness or abnormalities  Neck: Neck supple. No adenopathy. Thyroid symmetric, normal size,, Carotids without bruits.  ENT: NEGATIVE for ear, mouth and throat problems  Breast:  breasts symmetric, no dominant or suspicious mass, no skin or nipple changes, no axillary adenopathy, unchanged from previous exam or self exam in taught and encouraged  Cardiovascular: negative, PMI normal. No lifts, heaves, or thrills. RRR. No murmurs, clicks gallops or rub  Respiratory: negative, Percussion normal. Good diaphragmatic excursion. Lungs clear  Gastrointestinal: Abdomen soft, non-tender. BS normal. No masses, organomegaly  Genitourinary: Pelvic Exam:  External Genitalia:     Normal appearance for age, no discharge present, no tenderness present, no inflammatory lesions present, color normal  Vagina:     Normal vaginal vault without central or paravaginal defects, ATROPHIC  Bladder:     Nontender to palpation  Urethra:   Urethral Body:  Urethra palpation normal, urethra structural  support normal   Urethral Meatus:  No erythema or lesions present  Cervix:     Appearance healthy, no lesions present, nontender to palpation, no bleeding present  Uterus:     Nontender to palpation, no masses present, position anteflexed, mobility: normal  Adnexa:     No adnexal tenderness present, no adnexal masses present  Perineum:     Perineum within normal limits, no evidence of trauma, no rashes or skin lesions present  Inguinal Lymph Nodes:     No lymphadenopathy present    Musculoskeletalextremities normal- no gross deformities noted, gait normal and normal muscle tone  Integument: no suspicious lesions or rashes  Neurologic: Gait normal. Reflexes normal and symmetric. Sensation grossly WNL.  Psychiatric: mentation appears normal and affect normal/bright  Hematologic/Lymphatic/Immunologic: Normal cervical lymph nodes     ASSESSMENT:/PLAN:  (M81.0) Age-related osteoporosis without current pathological fracture  (primary encounter diagnosis)  Comment: Past history and recommendations reviewed  Plan: Vitamin D Deficiency, DX Hip/Pelvis/Spine        Tests ordered    (E78.5) Dyslipidemia  Comment: Past values reviewed  Plan: Lipid panel reflex to direct LDL Fasting,         Comprehensive metabolic panel (BMP + Alb, Alk         Phos, ALT, AST, Total. Bili, TP)        Ordered    (E78.5) Hyperlipidemia LDL goal <130  Comment: Patient tolerates Lipitor without concern  Plan: atorvastatin (LIPITOR) 20 MG tablet        If lab work is normal we will refill for a year    (Z01.411) Encounter for gynecological examination with abnormal finding  Comment: Skin mole check reviewed.  Otherwise doing well  Plan: UA reflex to Microscopic and Culture, Urine         Microscopic        Ordered      Joel Barrera M.D.

## 2018-08-06 NOTE — PROGRESS NOTES
Sushma, all your results are within normal limits albeit on the low end of normal.  I think taking a daily multivitamin with vitamin D would be helpful in addition to what she gets from your diet.  Please let me know if you have any questions  Joel Barrera M.D.

## 2018-08-06 NOTE — PATIENT INSTRUCTIONS
You can reach your [facility] Care Team any time of the day by calling [phone]. This number will put you in touch with the 24 hour nurse line if the clinic is closed.    To contact your OB/GYN Surgery Scheduler please call [phone]. This is a direct number for your care team between 8 a.m. and 4 p.m. Monday through Friday.    [facility] is open for your convenience: [phone]  Monday through Friday 8 a.m. to 8:30 p.m.  Saturday 9 a.m. to 6 p.m.  Sunday Noon to 6 p.m.    They are closed on all major holidays.

## 2018-08-10 ENCOUNTER — TELEPHONE (OUTPATIENT)
Dept: FAMILY MEDICINE | Facility: CLINIC | Age: 68
End: 2018-08-10

## 2018-08-10 ENCOUNTER — OFFICE VISIT (OUTPATIENT)
Dept: URGENT CARE | Facility: URGENT CARE | Age: 68
End: 2018-08-10
Payer: COMMERCIAL

## 2018-08-10 ENCOUNTER — RADIANT APPOINTMENT (OUTPATIENT)
Dept: GENERAL RADIOLOGY | Facility: CLINIC | Age: 68
End: 2018-08-10
Attending: FAMILY MEDICINE
Payer: COMMERCIAL

## 2018-08-10 VITALS
DIASTOLIC BLOOD PRESSURE: 92 MMHG | HEART RATE: 80 BPM | TEMPERATURE: 98.4 F | RESPIRATION RATE: 16 BRPM | OXYGEN SATURATION: 94 % | SYSTOLIC BLOOD PRESSURE: 142 MMHG

## 2018-08-10 DIAGNOSIS — R12 HEARTBURN: ICD-10-CM

## 2018-08-10 DIAGNOSIS — J44.9 CHRONIC OBSTRUCTIVE PULMONARY DISEASE, UNSPECIFIED COPD TYPE (H): ICD-10-CM

## 2018-08-10 DIAGNOSIS — R07.9 ACUTE CHEST PAIN: Primary | ICD-10-CM

## 2018-08-10 LAB — TROPONIN I SERPL-MCNC: <0.015 UG/L (ref 0–0.04)

## 2018-08-10 PROCEDURE — 84484 ASSAY OF TROPONIN QUANT: CPT | Performed by: FAMILY MEDICINE

## 2018-08-10 PROCEDURE — 71046 X-RAY EXAM CHEST 2 VIEWS: CPT | Mod: FY

## 2018-08-10 PROCEDURE — 36415 COLL VENOUS BLD VENIPUNCTURE: CPT | Performed by: FAMILY MEDICINE

## 2018-08-10 PROCEDURE — 99214 OFFICE O/P EST MOD 30 MIN: CPT | Performed by: FAMILY MEDICINE

## 2018-08-10 PROCEDURE — 93000 ELECTROCARDIOGRAM COMPLETE: CPT | Performed by: FAMILY MEDICINE

## 2018-08-10 NOTE — PROGRESS NOTES
SUBJECTIVE:  Sushma Marie is a 68 year old female with h/o COPD , asthma on spiriva and symbicort  who presents to the office with the CC of chest pain.  Patient complains of chest pain.which she is describing as burning , has been doing a lot of cleaning with bleach products   Also noticed that symptoms started after she started taking the new symbicort inhalor   The pain is characterized as moderate burning located left shoulder and right shoulder with radiation to upper back. Symptoms began 4 day(s) ago, sudden onset  Pain is associated with nausea.  Pain is exacerbated by lying down.  She has h/o reflux symptoms   Pain is relieved by none.  Cardiac risk factors: abnormal lipids  She also has been having some dry heaving and denies any chest pressure or heaviness   Denies any radiation of symptoms to left arm or left jaw area     Past Medical History:   Diagnosis Date     COPD (chronic obstructive pulmonary disease) (H) 2009     Hyperlipidemia      Mild intermittent asthma     Exercise induced     Osteoporosis, unspecified          Current Outpatient Prescriptions:      atorvastatin (LIPITOR) 20 MG tablet, Take 1 tablet (20 mg) by mouth daily, Disp: 90 tablet, Rfl: 3     IBANdronate (BONIVA) 150 MG tablet, Take 1 tablet (150 mg) by mouth every 30 days, Disp: 12 tablet, Rfl: 1     omeprazole (PRILOSEC) 20 MG CR capsule, Take 1 capsule (20 mg) by mouth daily, Disp: 30 capsule, Rfl: 1     phentermine 15 MG capsule, Take 1 capsule (15 mg) by mouth every morning, Disp: 30 capsule, Rfl: 0     SPIRIVA HANDIHALER 18 MCG IN CAPS, 1 CAPSULE DAILY, Disp: , Rfl:      SYMBICORT 80-4.5 MCG/ACT IN AERO, two puffs bid, Disp: , Rfl:      PROAIR  (90 BASE) MCG/ACT IN AERS, INHALE 1 TO 2 PUFFS EVERY 4 TO 6 HOURS AS NEEDED (Patient not taking: No sig reported), Disp: 1, Rfl: 0     venlafaxine (EFFEXOR-XR) 150 MG 24 hr capsule, Take 1 capsule (150 mg) by mouth daily (Patient not taking: Reported on 8/10/2018), Disp: 90  capsule, Rfl: 1    Social History   Substance Use Topics     Smoking status: Former Smoker     Smokeless tobacco: Never Used      Comment: quit 12 years ago     Alcohol use Yes      Comment: social       ROS:Review of systems negative except as stated above.    EXAM:  BP (!) 142/92  Pulse 80  Temp 98.4  F (36.9  C) (Oral)  Resp 16  LMP 02/06/1906  SpO2 94%  GENERAL APPEARANCE: healthy, alert and no distress  EYES: EOMI,  PERRL, conjunctiva clear  HENT: ear canals and TM's normal.  Nose and mouth without ulcers, erythema or lesions  NECK: supple, nontender, no lymphadenopathy  RESP: lungs clear to auscultation - no rales, rhonchi or wheezes  CV: regular rates and rhythm, normal S1 S2, no murmur noted  ABDOMEN:  soft, nontender, no HSM or masses and bowel sounds normal  SKIN: no suspicious lesions or rashes     Office EKG demonstrates:  appears normal, NSR,normal axis, normal intervals, no acute ST/T changes c/w ischemia,no LVH by voltage criteria,unchanged from previous tracings    Assessment  / IMPRESSION  Chest pain, acute  Sushma was seen today for urgent care.    Diagnoses and all orders for this visit:    Acute chest pain  -     Troponin I  -     EKG 12-lead complete w/read - Clinics  -     XR Chest 2 Views    Heartburn  -     omeprazole (PRILOSEC) 20 MG CR capsule; Take 1 capsule (20 mg) by mouth daily    Chronic obstructive pulmonary disease, unspecified COPD type (H)    continue inhalors for COPD   Advised to avoid caffeinated drinks   Also discussed about avoiding acidic foods        Discussed with pt that the symptoms seem to be related to gerd   Start omeprazole daily for a month   If symptoms don't get better should follow up   Also discussed with pt to avoid using bleach products     did spent>40 minutes with patient and > 50% of the time was for answering questions, discussing findings, counseling and coordination of care

## 2018-08-10 NOTE — TELEPHONE ENCOUNTER
Patient notified of  providers message, patient has no further questions.    Estephania BYRDRN BSN  [facility]  [phone]

## 2018-08-10 NOTE — TELEPHONE ENCOUNTER
"\"Burning of lungs\"      Onset: 4 days    Description (location/character/radiation/duration): started in the right lung and now is in her left lung    Intensity:  7-8/10 when she tries to sleep    Accompanying signs and symptoms:        Shortness of breath: YES- has COPD and SOB is at her baseline       Sweating: no        Nausea/vomitting: YES- nausea and dry heaves started 2 days ago       Palpitations: no        Other (fevers/chills/cough/heartburn/lightheadedness): no     History (similar episodes/previous evaluation): COPD    Precipitating or alleviating factors:       Worse with exertion: no        Worse with breathing: no        Related to eating: no        Better with burping: no     Therapies tried and outcome: Tylenol and Ibuprofen does not help       She said it is worse with laying down.      She said she had her physical last week and lungs were clear.   Her lungs have been hurting.  She denies fever. She denies cough.   She had dry heaves for a few days related to her stom  She said she cannot sleep due to \"burning in lungs\".  She has not used her inhalers as she feels it may irritate her lungs more.      She has been using Chlorox bleach at home to clean for the past 3 days.  She is unsure if she was using this when the burning in her lungs started.    She is speaking and breathing without difficulty over the phone.    Patient can be reached at [phone], okay to leave message.    RN advised office visit for evaluation but no openings with PCP.    Routing note to Dr. Melgoza to review and advise if he can see her today or if we should send to urgent care.        VIRY Koehler, RN, N  [facility]) [phone]        "

## 2018-08-10 NOTE — MR AVS SNAPSHOT
After Visit Summary   8/10/2018    Sushma Marie    MRN: [id number]           Patient Information     Date Of Birth          1950        Visit Information        Provider Department      8/10/2018 12:30 PM Nelda Gutierrez MD [facility]        Today's Diagnoses     Acute chest pain    -  1    Heartburn           Follow-ups after your visit        Who to contact     If you have questions or need follow up information about today's clinic visit or your schedule please contact [facility] directly at [phone].  Normal or non-critical lab and imaging results will be communicated to you by [facility], letter or phone within 4 business days after the clinic has received the results. If you do not hear from us within 7 days, please contact the clinic through [employer] or phone. If you have a critical or abnormal lab result, we will notify you by phone as soon as possible.  Submit refill requests through [employer] or call your pharmacy and they will forward the refill request to us. Please allow 3 business days for your refill to be completed.          Additional Information About Your Visit        MyChart Information     [employer] gives you secure access to your electronic health record. If you see a primary care provider, you can also send messages to your care team and make appointments. If you have questions, please call your primary care clinic.  If you do not have a primary care provider, please call [phone] and they will assist you.        Care EveryWhere ID     This is your Care EveryWhere ID. This could be used by other organizations to access your [facility] medical records  [id number]        Your Vitals Were     Pulse Temperature Respirations Last Period Pulse Oximetry       80 98.4  F (36.9  C) (Oral) 16 02/06/1906 94%        Blood Pressure from Last 3 Encounters:   08/10/18 (!) 142/92   08/02/18 140/88   05/10/18 160/90    Weight from  Last 3 Encounters:   08/02/18 192 lb (87.1 kg)   05/10/18 196 lb (88.9 kg)   05/07/18 197 lb (89.4 kg)              We Performed the Following     EKG 12-lead complete w/read - Clinics     Troponin I     XR Chest 2 Views          Today's Medication Changes          These changes are accurate as of 8/10/18  2:02 PM.  If you have any questions, ask your nurse or doctor.               Start taking these medicines.        Dose/Directions    omeprazole 20 MG CR capsule   Commonly known as:  priLOSEC   Used for:  Heartburn   Started by:  Nelda Gutierrez MD        Dose:  20 mg   Take 1 capsule (20 mg) by mouth daily   Quantity:  30 capsule   Refills:  1            Where to get your medicines      These medications were sent to [place] [address] [place] [facility] [address]  [address] [facility] [address]     Phone:  [phone]     omeprazole 20 MG CR capsule                Primary Care Provider Office Phone # Fax #    Manjit Melgoza [phone]       [address] [place] [address]        Equal Access to Services     [facility]: Hadii denia ku hadasho Sodilip, waaxda luqadaha, qaybta kaalmada ángela, alban mcmillan . So [facility] [phone].    ATENCIÓN: Si habla español, tiene a cottrell disposición servicios gratuitos de asistencia lingüística. [facility] [phone].    We comply with applicable federal civil rights laws and [place] laws. We do not discriminate on the basis of race, color, national origin, age, disability, sex, sexual orientation, or gender identity.            Thank you!     Thank you for choosing [facility]  for your care. Our goal is always to provide you with excellent care. Hearing back from our patients is one way we can continue to improve our services. Please take a few minutes to complete the written survey that you may receive in the mail after your visit with us. Thank you!             Your Updated  Medication List - Protect others around you: Learn how to safely use, store and throw away your medicines at www.disposemymeds.org.          This list is accurate as of 8/10/18  2:02 PM.  Always use your most recent med list.                   Brand Name Dispense Instructions for use Diagnosis    atorvastatin 20 MG tablet    LIPITOR    90 tablet    Take 1 tablet (20 mg) by mouth daily    Hyperlipidemia LDL goal <130       IBANdronate 150 MG tablet    BONIVA    12 tablet    Take 1 tablet (150 mg) by mouth every 30 days    Age-related osteoporosis without current pathological fracture       omeprazole 20 MG CR capsule    priLOSEC    30 capsule    Take 1 capsule (20 mg) by mouth daily    Heartburn       phentermine 15 MG capsule     30 capsule    Take 1 capsule (15 mg) by mouth every morning    Abnormal weight gain       PROAIR  (90 Base) MCG/ACT inhaler   Generic drug:  albuterol     1    INHALE 1 TO 2 PUFFS EVERY 4 TO 6 HOURS AS NEEDED    Mild intermittent asthma       SPIRIVA HANDIHALER 18 MCG capsule   Generic drug:  tiotropium      1 CAPSULE DAILY        SYMBICORT 80-4.5 MCG/ACT Inhaler   Generic drug:  budesonide-formoterol      two puffs bid        venlafaxine 150 MG 24 hr capsule    EFFEXOR-XR    90 capsule    Take 1 capsule (150 mg) by mouth daily    Adjustment disorder with depressed mood

## 2018-10-25 ENCOUNTER — HOSPITAL ENCOUNTER (OUTPATIENT)
Dept: MAMMOGRAPHY | Facility: CLINIC | Age: 68
Discharge: HOME | End: 2018-10-25
Attending: OBSTETRICS & GYNECOLOGY | Admitting: OBSTETRICS & GYNECOLOGY
Payer: MEDICARE

## 2018-10-25 ENCOUNTER — RADIANT APPOINTMENT (OUTPATIENT)
Dept: BONE DENSITY | Facility: CLINIC | Age: 68
End: 2018-10-25
Attending: OBSTETRICS & GYNECOLOGY
Payer: COMMERCIAL

## 2018-10-25 DIAGNOSIS — Z12.31 SCREENING MAMMOGRAM, ENCOUNTER FOR: ICD-10-CM

## 2018-10-25 DIAGNOSIS — M81.0 AGE-RELATED OSTEOPOROSIS WITHOUT CURRENT PATHOLOGICAL FRACTURE: ICD-10-CM

## 2018-10-25 PROCEDURE — 77063 BREAST TOMOSYNTHESIS BI: CPT

## 2018-10-25 PROCEDURE — 77080 DXA BONE DENSITY AXIAL: CPT | Performed by: INTERNAL MEDICINE

## 2018-10-28 NOTE — PROGRESS NOTES
Sushma, there has been an improvement in your bone density in your lower spine.  The bone density in your hips is about the same.  There is still osteoporosis present.  We recommend continuing with the Boniva, calcium and Vitamin D.  Please let me know if you have questions  Joel Barrera M.D.

## 2018-10-29 ENCOUNTER — OFFICE VISIT (OUTPATIENT)
Dept: FAMILY MEDICINE | Facility: CLINIC | Age: 68
End: 2018-10-29
Payer: COMMERCIAL

## 2018-10-29 VITALS
BODY MASS INDEX: 34.54 KG/M2 | OXYGEN SATURATION: 99 % | HEART RATE: 87 BPM | WEIGHT: 195 LBS | DIASTOLIC BLOOD PRESSURE: 80 MMHG | SYSTOLIC BLOOD PRESSURE: 134 MMHG | TEMPERATURE: 98.7 F

## 2018-10-29 DIAGNOSIS — J01.00 ACUTE MAXILLARY SINUSITIS, RECURRENCE NOT SPECIFIED: Primary | ICD-10-CM

## 2018-10-29 PROCEDURE — 99213 OFFICE O/P EST LOW 20 MIN: CPT | Performed by: FAMILY MEDICINE

## 2018-10-29 RX ORDER — AZITHROMYCIN 250 MG/1
TABLET, FILM COATED ORAL
Qty: 6 TABLET | Refills: 0 | Status: SHIPPED | OUTPATIENT
Start: 2018-10-29 | End: 2019-09-09

## 2018-10-29 ASSESSMENT — PATIENT HEALTH QUESTIONNAIRE - PHQ9
5. POOR APPETITE OR OVEREATING: MORE THAN HALF THE DAYS
SUM OF ALL RESPONSES TO PHQ QUESTIONS 1-9: 12

## 2018-10-29 ASSESSMENT — ANXIETY QUESTIONNAIRES
GAD7 TOTAL SCORE: 8
6. BECOMING EASILY ANNOYED OR IRRITABLE: SEVERAL DAYS
5. BEING SO RESTLESS THAT IT IS HARD TO SIT STILL: SEVERAL DAYS
7. FEELING AFRAID AS IF SOMETHING AWFUL MIGHT HAPPEN: SEVERAL DAYS
3. WORRYING TOO MUCH ABOUT DIFFERENT THINGS: SEVERAL DAYS
1. FEELING NERVOUS, ANXIOUS, OR ON EDGE: SEVERAL DAYS
2. NOT BEING ABLE TO STOP OR CONTROL WORRYING: SEVERAL DAYS
IF YOU CHECKED OFF ANY PROBLEMS ON THIS QUESTIONNAIRE, HOW DIFFICULT HAVE THESE PROBLEMS MADE IT FOR YOU TO DO YOUR WORK, TAKE CARE OF THINGS AT HOME, OR GET ALONG WITH OTHER PEOPLE: NOT DIFFICULT AT ALL

## 2018-10-29 NOTE — MR AVS SNAPSHOT
After Visit Summary   10/29/2018    Sushma Marie    MRN: [id number]           Patient Information     Date Of Birth          1950        Visit Information        Provider Department      10/29/2018 2:20 PM [facility]hen[facility] MD [facility]        Today's Diagnoses     Acute maxillary sinusitis, recurrence not specified    -  1       Follow-ups after your visit        Follow-up notes from your care team     Return in about 1 month (around 11/29/2018) for Mood Rechjay ROCHA .      Who to contact     If you have questions or need follow up information about today's clinic visit or your schedule please contact [facility] directly at [phone].  Normal or non-critical lab and imaging results will be communicated to you by [employer], letter or phone within 4 business days after the clinic has received the results. If you do not hear from us within 7 days, please contact the clinic through [employer] or phone. If you have a critical or abnormal lab result, we will notify you by phone as soon as possible.  Submit refill requests through [employer] or call your pharmacy and they will forward the refill request to us. Please allow 3 business days for your refill to be completed.          Additional Information About Your Visit        MyChart Information     [employer] gives you secure access to your electronic health record. If you see a primary care provider, you can also send messages to your care team and make appointments. If you have questions, please call your primary care clinic.  If you do not have a primary care provider, please call [phone] and they will assist you.        Care EveryWhere ID     This is your Care EveryWhere ID. This could be used by other organizations to access your [facility] medical records  [id number]        Your Vitals Were     Pulse Temperature Last Period Pulse Oximetry BMI (Body Mass Index)       87 98.7  F (37.1  C) (Tympanic) 02/06/1906  99% 34.54 kg/m2        Blood Pressure from Last 3 Encounters:   10/29/18 134/80   08/10/18 (!) 142/92   08/02/18 140/88    Weight from Last 3 Encounters:   10/29/18 195 lb (88.5 kg)   08/02/18 192 lb (87.1 kg)   05/10/18 196 lb (88.9 kg)              Today, you had the following     No orders found for display         Today's Medication Changes          These changes are accurate as of 10/29/18  2:31 PM.  If you have any questions, ask your nurse or doctor.               Start taking these medicines.        Dose/Directions    azithromycin 250 MG tablet   Commonly known as:  ZITHROMAX   Used for:  Acute maxillary sinusitis, recurrence not specified   Started by:  Mahendra Nova MD        Two tablets first day, then one tablet daily for four days.   Quantity:  6 tablet   Refills:  0            Where to get your medicines      These medications were sent to [place] [address] [place] [address] [facility] [address]     Phone:  [phone]     azithromycin 250 MG tablet                Primary Care Provider Office Phone # Fax #    Manjit Melgoza [phone]       [address] [facility] [address]        Equal Access to Services     RANDALL GIBSON AH: Hadii denia ku hadasho Soomaali, waaxda luqadaha, qaybta kaalmada adeegyada, waxay bo dixon. So [facility] [phone].    ATENCIÓN: Si habla español, tiene a cottrell disposición servicios gratuitos de asistencia lingüística. Llame al [phone].    We comply with applicable federal civil rights laws and [place] laws. We do not discriminate on the basis of race, color, national origin, age, disability, sex, sexual orientation, or gender identity.            Thank you!     Thank you for choosing [facility]  for your care. Our goal is always to provide you with excellent care. Hearing back from our patients is one way we can continue to improve our services. Please take a few minutes to  complete the written survey that you may receive in the mail after your visit with us. Thank you!             Your Updated Medication List - Protect others around you: Learn how to safely use, store and throw away your medicines at www.[email or web address].          This list is accurate as of 10/29/18  2:31 PM.  Always use your most recent med list.                   Brand Name Dispense Instructions for use Diagnosis    atorvastatin 20 MG tablet    LIPITOR    90 tablet    Take 1 tablet (20 mg) by mouth daily    Hyperlipidemia LDL goal <130       azithromycin 250 MG tablet    ZITHROMAX    6 tablet    Two tablets first day, then one tablet daily for four days.    Acute maxillary sinusitis, recurrence not specified       IBANdronate 150 MG tablet    BONIVA    12 tablet    Take 1 tablet (150 mg) by mouth every 30 days    Age-related osteoporosis without current pathological fracture       phentermine 15 MG capsule     30 capsule    Take 1 capsule (15 mg) by mouth every morning    Abnormal weight gain       PROAIR  (90 Base) MCG/ACT inhaler   Generic drug:  albuterol     1    INHALE 1 TO 2 PUFFS EVERY 4 TO 6 HOURS AS NEEDED    Mild intermittent asthma       SPIRIVA HANDIHALER 18 MCG capsule   Generic drug:  tiotropium      1 CAPSULE DAILY        SYMBICORT 80-4.5 MCG/ACT Inhaler   Generic drug:  budesonide-formoterol      two puffs bid        venlafaxine 150 MG 24 hr capsule    EFFEXOR-XR    90 capsule    Take 1 capsule (150 mg) by mouth daily    Adjustment disorder with depressed mood

## 2018-10-29 NOTE — PROGRESS NOTES
SUBJECTIVE:   Sushma Marie is a 68 year old female who presents to clinic today for the following health issues:      Acute Illness   Acute illness concerns: sinus and cough  Onset: x couple days    Fever: no    Chills/Sweats: YES    Headache (location?): YES    Sinus Pressure:YES    Conjunctivitis:  no    Ear Pain: no    Rhinorrhea: YES    Congestion: YES    Sore Throat: no     Cough: YES-productive of yellow sputum, productive of green sputum    Wheeze: no    Decreased Appetite: no    Nausea: no    Vomiting: no    Diarrhea:  no    Dysuria/Freq.: no    Fatigue/Achiness: YES    Sick/Strep Exposure: no     Therapies Tried and outcome:tyelnol and cough med            Problem list and histories reviewed & adjusted, as indicated.  Additional history: as documented    Patient Active Problem List   Diagnosis     Osteoporosis     COPD (chronic obstructive pulmonary disease) (H)     Dyslipidemia     HYPERLIPIDEMIA LDL GOAL <130     Adjustment disorder with depressed mood     Advanced directives, counseling/discussion     Past Surgical History:   Procedure Laterality Date     HC REMOVE TONSILS/ADENOIDS,<13 Y/O         Social History   Substance Use Topics     Smoking status: Former Smoker     Smokeless tobacco: Never Used      Comment: quit 12 years ago     Alcohol use Yes      Comment: social     Family History   Problem Relation Age of Onset     Cancer Mother       stomach cancer     Respiratory Father       emphysema and dementia     Family History Negative Brother       drug overdose     Family History Negative Sister      Family History Negative Sister       from status asthmaticus     HEART DISEASE Maternal Grandmother       stroke     HEART DISEASE Maternal Grandfather       MI     Anesthesia Reaction Daughter      Thyroid Disease Son      Asthma Child      Osteoporosis Maternal Aunt      Breast Cancer Maternal Aunt          Current Outpatient Prescriptions   Medication  Sig Dispense Refill     atorvastatin (LIPITOR) 20 MG tablet Take 1 tablet (20 mg) by mouth daily 90 tablet 3     azithromycin (ZITHROMAX) 250 MG tablet Two tablets first day, then one tablet daily for four days. 6 tablet 0     IBANdronate (BONIVA) 150 MG tablet Take 1 tablet (150 mg) by mouth every 30 days 12 tablet 1     phentermine 15 MG capsule Take 1 capsule (15 mg) by mouth every morning 30 capsule 0     PROAIR  (90 BASE) MCG/ACT IN AERS INHALE 1 TO 2 PUFFS EVERY 4 TO 6 HOURS AS NEEDED 1 0     SPIRIVA HANDIHALER 18 MCG IN CAPS 1 CAPSULE DAILY       SYMBICORT 80-4.5 MCG/ACT IN AERO two puffs bid       venlafaxine (EFFEXOR-XR) 150 MG 24 hr capsule Take 1 capsule (150 mg) by mouth daily 90 capsule 1     Allergies   Allergen Reactions     Sulfa [Sulfacetamide Sodium] GI Disturbance       Reviewed and updated as needed this visit by clinical staff  Tobacco  Allergies  Meds  Med Hx  Surg Hx  Fam Hx  Soc Hx      Reviewed and updated as needed this visit by Provider         ROS:  INTEGUMENTARY/SKIN: NEGATIVE for worrisome rashes, moles or lesions  GI: NEGATIVE for nausea, abdominal pain, heartburn, or change in bowel habits    OBJECTIVE:                                                    /80  Pulse 87  Temp 98.7  F (37.1  C) (Tympanic)  Wt 195 lb (88.5 kg)  LMP 02/06/1906  SpO2 99%  BMI 34.54 kg/m2  Body mass index is 34.54 kg/(m^2).   GENERAL: healthy, alert, well nourished, well hydrated, no distress  HENT: ear canals- normal; TMs- normal; Nose- normal; maxillary sinus tenderness noted on exam mouth- no ulcers, no lesions  NECK: no tenderness, no adenopathy, no asymmetry, no masses, no stiffness; thyroid- normal to palpation  RESP: lungs clear to auscultation - no rales, no rhonchi, no wheezes  CV: regular rates and rhythm, normal S1 S2, no S3 or S4 and no murmur, no click or rub -  ABDOMEN: soft, no tenderness, no  hepatosplenomegaly, no masses, normal bowel sounds         ASSESSMENT/PLAN:                                                         ICD-10-CM    1. Acute maxillary sinusitis, recurrence not specified J01.00 azithromycin (ZITHROMAX) 250 MG tablet     Started patient on a course of azithromycin for acute sinus infection.  Recommended stay well-hydrated.  If symptoms are not improving in the next few days, instructed to notify me back.      Mahendra Nova MD  [facility]

## 2018-10-30 ASSESSMENT — ANXIETY QUESTIONNAIRES: GAD7 TOTAL SCORE: 8

## 2018-11-18 DIAGNOSIS — R63.5 ABNORMAL WEIGHT GAIN: ICD-10-CM

## 2018-11-19 RX ORDER — PHENTERMINE HYDROCHLORIDE 15 MG/1
CAPSULE ORAL
Qty: 30 CAPSULE | Refills: 0 | Status: SHIPPED | OUTPATIENT
Start: 2018-11-19 | End: 2019-06-20

## 2018-11-19 NOTE — TELEPHONE ENCOUNTER
Phentermine 15mg      Last Written Prescription Date:  5/7/18  Last Fill Quantity: 30,   # refills: 0  Last Office Visit: 10/29/18  Future Office visit:       Routing refill request to provider for review/approval because:  Drug not on the [facility], [facility] or [facility] refill protocol or controlled substance    Orin Barlow CMA

## 2018-11-26 DIAGNOSIS — R63.5 ABNORMAL WEIGHT GAIN: ICD-10-CM

## 2018-11-26 RX ORDER — PHENTERMINE HYDROCHLORIDE 15 MG/1
CAPSULE ORAL
Qty: 30 CAPSULE | OUTPATIENT
Start: 2018-11-26

## 2018-11-26 NOTE — TELEPHONE ENCOUNTER
Last Written Prescription Date:  11/19/18  Last Fill Quantity: 30,  # refills: 0   Last office visit: 10/29/2018 with prescribing provider:     Future Office Visit:    Requested Prescriptions   Pending Prescriptions Disp Refills     phentermine (ADIPEX-P) 15 MG capsule [Pharmacy Med Name: PHENTERMINE 15 MG CAPSULE] 30 capsule      Sig: TAKE 1 CAPSULE BY MOUTH EVERY MORNING    There is no refill protocol information for this order

## 2018-11-26 NOTE — TELEPHONE ENCOUNTER
Routing refill request to provider for review/approval because:  Drug not on the [facility], [facility] or [facility] refill protocol or controlled substance    Belinda Dale RN - Triage  [facility]

## 2019-01-17 DIAGNOSIS — F43.21 ADJUSTMENT DISORDER WITH DEPRESSED MOOD: ICD-10-CM

## 2019-01-17 RX ORDER — VENLAFAXINE HYDROCHLORIDE 150 MG/1
150 CAPSULE, EXTENDED RELEASE ORAL DAILY
Qty: 90 CAPSULE | Refills: 1 | Status: SHIPPED | OUTPATIENT
Start: 2019-01-17 | End: 2019-04-04

## 2019-01-17 NOTE — TELEPHONE ENCOUNTER
"Requested Prescriptions   Pending Prescriptions Disp Refills     venlafaxine (EFFEXOR-XR) 150 MG 24 hr capsule [Pharmacy Med Name: VENLAFAXINE HCL  MG CAP] 90 capsule 1     Sig: TAKE 1 CAPSULE (150 MG) BY MOUTH DAILY    Serotonin-Norepinephrine Reuptake Inhibitors  Failed - 1/17/2019  5:30 PM       Failed - PHQ-9 score of less than 5 in past 6 months    Please review last PHQ-9 score.          Passed - Blood pressure under 140/90 in past 12 months    BP Readings from Last 3 Encounters:   10/29/18 134/80   08/10/18 (!) 142/92   08/02/18 140/88                Passed - Medication is active on med list       Passed - Patient is age 18 or older       Passed - No active pregnancy on record       Passed - Normal serum creatinine on file in past 12 months    Recent Labs   Lab Test 08/02/18  1604   CR 0.74            Passed - No positive pregnancy test in past 12 months       Passed - Recent (6 mo) or future (30 days) visit within the authorizing provider's specialty    Patient had office visit in the last 6 months or has a visit in the next 30 days with authorizing provider or within the authorizing provider's specialty.  See \"Patient Info\" tab in inbasket, or \"Choose Columns\" in Meds & Orders section of the refill encounter.            PHQ-9 SCORE 3/16/2017 5/7/2018 10/29/2018   PHQ-9 Total Score - - -   PHQ-9 Total Score MyChart - - -   PHQ-9 Total Score 3 13 12       Routing refill request to provider for review/approval because:  PHQ out of range    Shannan Sanz RN   [facility] - Triage           "

## 2019-04-04 DIAGNOSIS — F43.21 ADJUSTMENT DISORDER WITH DEPRESSED MOOD: ICD-10-CM

## 2019-04-05 NOTE — TELEPHONE ENCOUNTER
"Requested Prescriptions   Pending Prescriptions Disp Refills     venlafaxine (EFFEXOR-XR) 150 MG 24 hr capsule [Pharmacy Med Name: VENLAFAXINE HCL  MG CAP] 90 capsule 1     Sig: TAKE 1 CAPSULE (150 MG) BY MOUTH DAILY    Serotonin-Norepinephrine Reuptake Inhibitors  Failed - 4/4/2019  8:41 PM       Failed - PHQ-9 score of less than 5 in past 6 months    Please review last PHQ-9 score.          Passed - Blood pressure under 140/90 in past 12 months    BP Readings from Last 3 Encounters:   10/29/18 134/80   08/10/18 (!) 142/92   08/02/18 140/88                Passed - Medication is active on med list       Passed - Patient is age 18 or older       Passed - No active pregnancy on record       Passed - Normal serum creatinine on file in past 12 months    Recent Labs   Lab Test 08/02/18  1604   CR 0.74            Passed - No positive pregnancy test in past 12 months       Passed - Recent (6 mo) or future (30 days) visit within the authorizing provider's specialty    Patient had office visit in the last 6 months or has a visit in the next 30 days with authorizing provider or within the authorizing provider's specialty.  See \"Patient Info\" tab in inbasket, or \"Choose Columns\" in Meds & Orders section of the refill encounter.            Last Written Prescription Date:  1/17/2019  Last Fill Quantity: 90,  # refills: 1   Last office visit: 10/29/2018 with prescribing provider:  10/29/2018   Future Office Visit:    PHQ-9 SCORE 3/16/2017 5/7/2018 10/29/2018   PHQ-9 Total Score - - -   PHQ-9 Total Score MyChart - - -   PHQ-9 Total Score 3 13 12     "

## 2019-04-05 NOTE — TELEPHONE ENCOUNTER
Left non-detailed message to call the clinic back at [phone] and ask to speak with a  triage nurse.   Phoebe Hernandez RN

## 2019-04-08 RX ORDER — VENLAFAXINE HYDROCHLORIDE 150 MG/1
150 CAPSULE, EXTENDED RELEASE ORAL DAILY
Qty: 30 CAPSULE | Refills: 0 | Status: SHIPPED | OUTPATIENT
Start: 2019-04-08 | End: 2019-06-12

## 2019-04-08 ASSESSMENT — PATIENT HEALTH QUESTIONNAIRE - PHQ9: SUM OF ALL RESPONSES TO PHQ QUESTIONS 1-9: 11

## 2019-04-08 NOTE — TELEPHONE ENCOUNTER
Patient was called to update PHQ-9 and inform her she was due for a follow up in 111/29/18. Patient stated if there anyway we can give a 30 day supply until she schedules.     PHQ-9 SCORE 5/7/2018 10/29/2018 4/8/2019   PHQ-9 Total Score - - -   PHQ-9 Total Score MyChart - - -   PHQ-9 Total Score 13 12 11       Routing refill request to provider for review/approval because:  Labs out of range:  PHQ-9  Patient needs to be seen because:  Due for follow up.     Yamel BAIRESN, RN   [facility]

## 2019-06-12 DIAGNOSIS — F43.21 ADJUSTMENT DISORDER WITH DEPRESSED MOOD: ICD-10-CM

## 2019-06-12 RX ORDER — VENLAFAXINE HYDROCHLORIDE 150 MG/1
150 CAPSULE, EXTENDED RELEASE ORAL DAILY
Qty: 15 CAPSULE | Refills: 0 | Status: SHIPPED | OUTPATIENT
Start: 2019-06-12 | End: 2019-06-20

## 2019-06-12 NOTE — TELEPHONE ENCOUNTER
Reason for Call:  Medication or medication refill:    Do you use a [facility]?  Name of the pharmacy and phone number for the current request:    [facility] [address] [place] [address]    Name of the medication requested:  venlafaxine (EFFEXOR-XR) 150 MG 24 hr patient made an appt with Dr Melgoza for next Wednesday and only has a couple tablets left.  Leaving town this weekend.    Other request: Please send asap    Can we leave a detailed message on this number? YES    Phone number patient can be reached at: Cell number on file:    Telephone Information:   Mobile [phone]       Best Time: anytime    Call taken on 6/12/2019 at 11:44 AM by Rita Fong

## 2019-06-12 NOTE — TELEPHONE ENCOUNTER
"  PHQ-9 SCORE 5/7/2018 10/29/2018 4/8/2019   PHQ-9 Total Score - - -   PHQ-9 Total Score MyChart - - -   PHQ-9 Total Score 13 12 11     Requested Prescriptions   Pending Prescriptions Disp Refills     venlafaxine (EFFEXOR-XR) 150 MG 24 hr capsule 30 capsule 0     Sig: Take 1 capsule (150 mg) by mouth daily       Serotonin-Norepinephrine Reuptake Inhibitors  Failed - 6/12/2019 11:45 AM        Failed - PHQ-9 score of less than 5 in past 6 months     Please review last PHQ-9 score.           Passed - Blood pressure under 140/90 in past 12 months     BP Readings from Last 3 Encounters:   10/29/18 134/80   08/10/18 (!) 142/92   08/02/18 140/88                 Passed - Medication is active on med list        Passed - Patient is age 18 or older        Passed - No active pregnancy on record        Passed - Normal serum creatinine on file in past 12 months     Recent Labs   Lab Test 08/02/18  1604   CR 0.74             Passed - No positive pregnancy test in past 12 months        Passed - Recent (6 mo) or future (30 days) visit within the authorizing provider's specialty     Patient had office visit in the last 6 months or has a visit in the next 30 days with authorizing provider or within the authorizing provider's specialty.  See \"Patient Info\" tab in inbasket, or \"Choose Columns\" in Meds & Orders section of the refill encounter.              "

## 2019-06-20 ENCOUNTER — OFFICE VISIT (OUTPATIENT)
Dept: FAMILY MEDICINE | Facility: CLINIC | Age: 69
End: 2019-06-20
Payer: COMMERCIAL

## 2019-06-20 VITALS
WEIGHT: 189 LBS | HEART RATE: 83 BPM | BODY MASS INDEX: 33.49 KG/M2 | RESPIRATION RATE: 16 BRPM | DIASTOLIC BLOOD PRESSURE: 80 MMHG | HEIGHT: 63 IN | OXYGEN SATURATION: 97 % | TEMPERATURE: 98.3 F | SYSTOLIC BLOOD PRESSURE: 132 MMHG

## 2019-06-20 DIAGNOSIS — E78.5 HYPERLIPIDEMIA LDL GOAL <130: Primary | ICD-10-CM

## 2019-06-20 DIAGNOSIS — J44.0 CHRONIC OBSTRUCTIVE PULMONARY DISEASE WITH ACUTE LOWER RESPIRATORY INFECTION (H): ICD-10-CM

## 2019-06-20 DIAGNOSIS — H61.20 WAX IN EAR: ICD-10-CM

## 2019-06-20 DIAGNOSIS — R63.5 ABNORMAL WEIGHT GAIN: ICD-10-CM

## 2019-06-20 DIAGNOSIS — F43.21 ADJUSTMENT DISORDER WITH DEPRESSED MOOD: ICD-10-CM

## 2019-06-20 PROCEDURE — 99214 OFFICE O/P EST MOD 30 MIN: CPT | Performed by: FAMILY MEDICINE

## 2019-06-20 RX ORDER — VENLAFAXINE HYDROCHLORIDE 150 MG/1
150 CAPSULE, EXTENDED RELEASE ORAL DAILY
Qty: 90 CAPSULE | Refills: 3 | Status: SHIPPED | OUTPATIENT
Start: 2019-06-20 | End: 2021-08-18

## 2019-06-20 RX ORDER — PHENTERMINE HYDROCHLORIDE 15 MG/1
CAPSULE ORAL
Qty: 30 CAPSULE | Refills: 2 | Status: SHIPPED | OUTPATIENT
Start: 2019-06-20 | End: 2019-09-09

## 2019-06-20 RX ORDER — PHENTERMINE HYDROCHLORIDE 15 MG/1
CAPSULE ORAL
Qty: 30 CAPSULE | Refills: 0 | Status: SHIPPED | OUTPATIENT
Start: 2019-06-20 | End: 2019-06-20

## 2019-06-20 ASSESSMENT — ANXIETY QUESTIONNAIRES
7. FEELING AFRAID AS IF SOMETHING AWFUL MIGHT HAPPEN: SEVERAL DAYS
GAD7 TOTAL SCORE: 7
6. BECOMING EASILY ANNOYED OR IRRITABLE: SEVERAL DAYS
3. WORRYING TOO MUCH ABOUT DIFFERENT THINGS: SEVERAL DAYS
5. BEING SO RESTLESS THAT IT IS HARD TO SIT STILL: SEVERAL DAYS
1. FEELING NERVOUS, ANXIOUS, OR ON EDGE: SEVERAL DAYS
2. NOT BEING ABLE TO STOP OR CONTROL WORRYING: SEVERAL DAYS
IF YOU CHECKED OFF ANY PROBLEMS ON THIS QUESTIONNAIRE, HOW DIFFICULT HAVE THESE PROBLEMS MADE IT FOR YOU TO DO YOUR WORK, TAKE CARE OF THINGS AT HOME, OR GET ALONG WITH OTHER PEOPLE: SOMEWHAT DIFFICULT

## 2019-06-20 ASSESSMENT — MIFFLIN-ST. JEOR: SCORE: 1351.43

## 2019-06-20 ASSESSMENT — PATIENT HEALTH QUESTIONNAIRE - PHQ9
SUM OF ALL RESPONSES TO PHQ QUESTIONS 1-9: 11
5. POOR APPETITE OR OVEREATING: SEVERAL DAYS

## 2019-06-20 NOTE — PROGRESS NOTES
Subjective     Sushma Marie is a 68 year old female who presents to clinic today for the following health issues:    HPI   Depression and Anxiety Follow-Up    How are you doing with your depression since your last visit? No change    How are you doing with your anxiety since your last visit?  Worsened     Are you having other symptoms that might be associated with depression or anxiety? Yes:  trouble sleeping     Have you had a significant life event? No     Do you have any concerns with your use of alcohol or other drugs? No    Social History     Tobacco Use     Smoking status: Former Smoker     Smokeless tobacco: Never Used     Tobacco comment: quit 12 years ago   Substance Use Topics     Alcohol use: Yes     Comment: social     Drug use: No     PHQ 5/7/2018 10/29/2018 4/8/2019   PHQ-9 Total Score 13 12 11   Q9: Thoughts of better off dead/self-harm past 2 weeks Not at all Not at all Not at all     MATEUS-7 SCORE 3/16/2017 5/7/2018 10/29/2018   Total Score - - -   Total Score - - -   Total Score 5 7 8     No flowsheet data found.  No flowsheet data found.      Suicide Assessment Five-step Evaluation and Treatment (SAFE-T)    Amount of exercise or physical activity: 2-3 days/week for an average of 30-45 minutes    Problems taking medications regularly: No    Medication side effects: none    Diet: regular (no restrictions)      Ear Problem       Duration: 2-6 months     Description (location/character/radiation): both ears feel itchy and possibly ear drainage from left ear          Patient Active Problem List   Diagnosis     Osteoporosis     COPD (chronic obstructive pulmonary disease) (H)     Dyslipidemia     HYPERLIPIDEMIA LDL GOAL <130     Adjustment disorder with depressed mood     Advanced directives, counseling/discussion     Past Surgical History:   Procedure Laterality Date     HC REMOVE TONSILS/ADENOIDS,<13 Y/O         Social History     Tobacco Use     Smoking status: Former Smoker     Smokeless tobacco:  Never Used     Tobacco comment: quit 12 years ago   Substance Use Topics     Alcohol use: Yes     Comment: social     Family History   Problem Relation Age of Onset     Cancer Mother          stomach cancer     Respiratory Father          emphysema and dementia     Family History Negative Brother          drug overdose     Family History Negative Sister      Family History Negative Sister          from status asthmaticus     Heart Disease Maternal Grandmother          stroke     Heart Disease Maternal Grandfather          MI     Anesthesia Reaction Daughter      Thyroid Disease Son      Asthma Child      Osteoporosis Maternal Aunt      Breast Cancer Maternal Aunt          Current Outpatient Medications   Medication Sig Dispense Refill     atorvastatin (LIPITOR) 20 MG tablet Take 1 tablet (20 mg) by mouth daily 90 tablet 3     IBANdronate (BONIVA) 150 MG tablet Take 1 tablet (150 mg) by mouth every 30 days 12 tablet 1     phentermine (ADIPEX-P) 15 MG capsule TAKE ONE CAPSULE BY MOUTH ONCE EVERY MORNING 30 capsule 2     PROAIR  (90 BASE) MCG/ACT IN AERS INHALE 1 TO 2 PUFFS EVERY 4 TO 6 HOURS AS NEEDED 1 0     SPIRIVA HANDIHALER 18 MCG IN CAPS 1 CAPSULE DAILY       SYMBICORT 80-4.5 MCG/ACT IN AERO two puffs bid       venlafaxine (EFFEXOR-XR) 150 MG 24 hr capsule Take 1 capsule (150 mg) by mouth daily 90 capsule 3     azithromycin (ZITHROMAX) 250 MG tablet Two tablets first day, then one tablet daily for four days. (Patient not taking: Reported on 2019) 6 tablet 0     Allergies   Allergen Reactions     Sulfa [Sulfacetamide Sodium] GI Disturbance     Recent Labs   Lab Test 18  1604 17  1453 16  1430 04/06/15  1008   LDL 99 97 84 73   HDL 73 96 69 75   TRIG 131 142 172* 82   ALT 32  --  26 27   CR 0.74  --  0.71 0.68   GFRESTIMATED 78  --  82 87   GFRESTBLACK >90  --  >90   GFR Calc   >90   GFR Calc     POTASSIUM 4.0   "--  3.9 4.2      BP Readings from Last 3 Encounters:   06/20/19 132/80   10/29/18 134/80   08/10/18 (!) 142/92    Wt Readings from Last 3 Encounters:   06/20/19 85.7 kg (189 lb)   10/29/18 88.5 kg (195 lb)   08/02/18 87.1 kg (192 lb)            Reviewed and updated as needed this visit by Provider         Review of Systems   ROS COMP: Constitutional, HEENT, cardiovascular, pulmonary, gi and gu systems are negative, except as otherwise noted.      Objective    /80 (Cuff Size: Adult Regular)   Pulse 83   Temp 98.3  F (36.8  C) (Tympanic)   Resp 16   Ht 1.6 m (5' 3\")   Wt 85.7 kg (189 lb)   LMP 02/06/1906   SpO2 97%   BMI 33.48 kg/m    Body mass index is 33.48 kg/m .  Physical Exam   GENERAL: healthy, alert and no distress  EYES: Eyes grossly normal to inspection, PERRL and conjunctivae and sclerae normal  HENT: normal cephalic/atraumatic, right ear: small was building up, left ear: normal: no effusions, no erythema, normal landmarks, nose and mouth without ulcers or lesions, oropharynx clear and oral mucous membranes moist  NECK: no adenopathy, no asymmetry, masses, or scars and thyroid normal to palpation  RESP: lungs clear to auscultation - no rales, rhonchi or wheezes  CV: regular rate and rhythm, normal S1 S2, no S3 or S4, no murmur, click or rub, no peripheral edema and peripheral pulses strong  ABDOMEN: soft, nontender, no hepatosplenomegaly, no masses and bowel sounds normal  MS: no gross musculoskeletal defects noted, no edema            Assessment & Plan     1. Hyperlipidemia LDL goal <130    - Comprehensive metabolic panel; Future  - Lipid panel reflex to direct LDL Fasting; Future    2. Chronic obstructive pulmonary disease with acute lower respiratory infection (H)  Stable without clinical finding of worsening     3. Adjustment disorder with depressed mood  Stable with current dose of meds, will keep monitoring   - venlafaxine (EFFEXOR-XR) 150 MG 24 hr capsule; Take 1 capsule (150 mg) by " "mouth daily  Dispense: 90 capsule; Refill: 3    4. Abnormal weight gain  Stable, will have her to try as needed   - phentermine (ADIPEX-P) 15 MG capsule; TAKE ONE CAPSULE BY MOUTH ONCE EVERY MORNING  Dispense: 30 capsule; Refill: 2    5. Wax in ear  Encouraged her to try debrox as needed to prevent building up wax        BMI:   Estimated body mass index is 33.48 kg/m  as calculated from the following:    Height as of this encounter: 1.6 m (5' 3\").    Weight as of this encounter: 85.7 kg (189 lb).           FUTURE APPOINTMENTS:       - Follow-up visit in 8 months for CPE    No follow-ups on file.    Manjit Melgzoa MD  [facility] [place]        "

## 2019-06-21 DIAGNOSIS — E78.5 HYPERLIPIDEMIA LDL GOAL <130: ICD-10-CM

## 2019-06-21 LAB
ALBUMIN SERPL-MCNC: 3.9 G/DL (ref 3.4–5)
ALP SERPL-CCNC: 94 U/L (ref 40–150)
ALT SERPL W P-5'-P-CCNC: 30 U/L (ref 0–50)
ANION GAP SERPL CALCULATED.3IONS-SCNC: 11 MMOL/L (ref 3–14)
AST SERPL W P-5'-P-CCNC: 29 U/L (ref 0–45)
BILIRUB SERPL-MCNC: 0.4 MG/DL (ref 0.2–1.3)
BUN SERPL-MCNC: 17 MG/DL (ref 7–30)
CALCIUM SERPL-MCNC: 9 MG/DL (ref 8.5–10.1)
CHLORIDE SERPL-SCNC: 107 MMOL/L (ref 94–109)
CHOLEST SERPL-MCNC: 181 MG/DL
CO2 SERPL-SCNC: 23 MMOL/L (ref 20–32)
CREAT SERPL-MCNC: 0.79 MG/DL (ref 0.52–1.04)
GFR SERPL CREATININE-BSD FRML MDRD: 76 ML/MIN/{1.73_M2}
GLUCOSE SERPL-MCNC: 106 MG/DL (ref 70–99)
HDLC SERPL-MCNC: 74 MG/DL
LDLC SERPL CALC-MCNC: 84 MG/DL
NONHDLC SERPL-MCNC: 107 MG/DL
POTASSIUM SERPL-SCNC: 4 MMOL/L (ref 3.4–5.3)
PROT SERPL-MCNC: 7 G/DL (ref 6.8–8.8)
SODIUM SERPL-SCNC: 141 MMOL/L (ref 133–144)
TRIGL SERPL-MCNC: 117 MG/DL

## 2019-06-21 PROCEDURE — 80061 LIPID PANEL: CPT | Performed by: FAMILY MEDICINE

## 2019-06-21 PROCEDURE — 36415 COLL VENOUS BLD VENIPUNCTURE: CPT | Performed by: FAMILY MEDICINE

## 2019-06-21 PROCEDURE — 80053 COMPREHEN METABOLIC PANEL: CPT | Performed by: FAMILY MEDICINE

## 2019-06-21 ASSESSMENT — ANXIETY QUESTIONNAIRES: GAD7 TOTAL SCORE: 7

## 2019-08-28 ENCOUNTER — TRANSFERRED RECORDS (OUTPATIENT)
Dept: HEALTH INFORMATION MANAGEMENT | Facility: CLINIC | Age: 69
End: 2019-08-28

## 2019-09-09 ENCOUNTER — OFFICE VISIT (OUTPATIENT)
Dept: OBGYN | Facility: CLINIC | Age: 69
End: 2019-09-09
Payer: COMMERCIAL

## 2019-09-09 VITALS — DIASTOLIC BLOOD PRESSURE: 82 MMHG | WEIGHT: 184 LBS | BODY MASS INDEX: 32.59 KG/M2 | SYSTOLIC BLOOD PRESSURE: 136 MMHG

## 2019-09-09 DIAGNOSIS — E78.5 HYPERLIPIDEMIA LDL GOAL <130: ICD-10-CM

## 2019-09-09 DIAGNOSIS — M81.0 AGE-RELATED OSTEOPOROSIS WITHOUT CURRENT PATHOLOGICAL FRACTURE: ICD-10-CM

## 2019-09-09 DIAGNOSIS — Z00.00 ENCOUNTER FOR MEDICARE ANNUAL WELLNESS EXAM: ICD-10-CM

## 2019-09-09 DIAGNOSIS — Z01.411 ENCOUNTER FOR GYNECOLOGICAL EXAMINATION WITH ABNORMAL FINDING: ICD-10-CM

## 2019-09-09 DIAGNOSIS — Z12.31 ENCOUNTER FOR SCREENING MAMMOGRAM FOR BREAST CANCER: Primary | ICD-10-CM

## 2019-09-09 LAB
ALBUMIN UR-MCNC: NEGATIVE MG/DL
APPEARANCE UR: CLEAR
BILIRUB UR QL STRIP: NEGATIVE
COLOR UR AUTO: YELLOW
GLUCOSE UR STRIP-MCNC: NEGATIVE MG/DL
HGB UR QL STRIP: NEGATIVE
KETONES UR STRIP-MCNC: NEGATIVE MG/DL
LEUKOCYTE ESTERASE UR QL STRIP: NEGATIVE
NITRATE UR QL: NEGATIVE
PH UR STRIP: 5 PH (ref 5–7)
SOURCE: NORMAL
SP GR UR STRIP: 1.02 (ref 1–1.03)
UROBILINOGEN UR STRIP-ACNC: 0.2 EU/DL (ref 0.2–1)

## 2019-09-09 PROCEDURE — 81003 URINALYSIS AUTO W/O SCOPE: CPT | Performed by: OBSTETRICS & GYNECOLOGY

## 2019-09-09 PROCEDURE — 99397 PER PM REEVAL EST PAT 65+ YR: CPT | Performed by: OBSTETRICS & GYNECOLOGY

## 2019-09-09 RX ORDER — ATORVASTATIN CALCIUM 20 MG/1
20 TABLET, FILM COATED ORAL DAILY
Qty: 90 TABLET | Refills: 3 | Status: SHIPPED | OUTPATIENT
Start: 2019-09-09 | End: 2019-09-10

## 2019-09-09 NOTE — PATIENT INSTRUCTIONS
You can reach your [facility] Care Team any time of the day by calling [phone]. This number will put you in touch with the 24 hour nurse line if the clinic is closed.    To contact your OB/GYN Station Coordinator/Surgery Scheduler please call [phone]. This is a direct number for your care team between 8 a.m. and 4 p.m. Monday through Friday.    [facility] is open for your convenience:  Monday through Friday 8 a.m. to 6 p.m.  Closed weekends and all major holidays.    Patient Education   Personalized Prevention Plan  You are due for the preventive services outlined below.  Your care team is available to assist you in scheduling these services.  If you have already completed any of these items, please share that information with your care team to update in your medical record.  Health Maintenance Due   Topic Date Due     Hepatitis C Screening  1950     COPD Action Plan  1950     Zoster (Shingles) Vaccine (2 of 3) 10/27/2015     FALL RISK ASSESSMENT  03/16/2018     Annual Wellness Visit  08/02/2019     Flu Vaccine (1) 09/01/2019

## 2019-09-09 NOTE — NURSING NOTE
"Chief Complaint   Patient presents with     Physical       Initial /82   Wt 83.5 kg (184 lb)   LMP 1906   BMI 32.59 kg/m   Estimated body mass index is 32.59 kg/m  as calculated from the following:    Height as of 19: 1.6 m (5' 3\").    Weight as of this encounter: 83.5 kg (184 lb).  BP completed using cuff size: regular    Questioned patient about current smoking habits.  Pt. quit smoking some time ago.          The following HM Due: mammogram      The following patient reported/Care Every where data was sent to:  P ABSTRACT QUALITY INITIATIVES [[id number]]        orders have been placed      China Dumont CMA                 "

## 2019-09-10 ENCOUNTER — TELEPHONE (OUTPATIENT)
Dept: OBGYN | Facility: CLINIC | Age: 69
End: 2019-09-10

## 2019-09-10 RX ORDER — ATORVASTATIN CALCIUM 20 MG/1
20 TABLET, FILM COATED ORAL DAILY
Qty: 90 TABLET | Refills: 3 | Status: SHIPPED | OUTPATIENT
Start: 2019-09-10 | End: 2020-07-09

## 2019-09-10 RX ORDER — ATORVASTATIN CALCIUM 20 MG/1
20 TABLET, FILM COATED ORAL DAILY
Qty: 90 TABLET | Refills: 3 | Status: SHIPPED | OUTPATIENT
Start: 2019-09-10 | End: 2019-09-10

## 2019-09-10 RX ORDER — ATORVASTATIN CALCIUM 20 MG/1
20 TABLET, FILM COATED ORAL DAILY
Qty: 90 TABLET | Refills: 3 | Status: SHIPPED | OUTPATIENT
Start: 2019-09-10 | End: 2020-10-09

## 2019-09-10 NOTE — PROGRESS NOTES
HPI:  Sushma Marie is a 69 year old white female  ,menopause for contraception who presents for an annual exam and pap.  She is otherwise doing fairly well.  She saw her pulmonologist and her COPD is stable.  Denies any vaginal bleeding.  I discussed her osteoporosis today and the risk benefits and alternative forms of therapy.  The patient states that she is not consistent in taking Boniva.  We reviewed her previous DEXA scan and lab results.  We discussed adequate calcium and vitamin D intake as well as bisphosphonate use.  I also reviewed her lipid panel and most recent testing from  of this year.  She is tolerating the Lipitor well and desires to remain on this  Self breast exam,  ACS screening mammogram recs, the use of 81 mg ASA to decrease the risk of heart disease, lipid screening, colon cancer screening recs and Dexa scan recs thoroughly reveiwed.      Past Medical History:   Diagnosis Date     COPD (chronic obstructive pulmonary disease) (H)      Hyperlipidemia      Mild intermittent asthma     Exercise induced     Osteoporosis, unspecified      Past Surgical History:   Procedure Laterality Date     HC REMOVE TONSILS/ADENOIDS,<13 Y/O       Family History   Problem Relation Age of Onset     Cancer Mother          stomach cancer     Respiratory Father          emphysema and dementia     Family History Negative Brother          drug overdose     Family History Negative Sister      Family History Negative Sister          from status asthmaticus     Heart Disease Maternal Grandmother          stroke     Heart Disease Maternal Grandfather          MI     Anesthesia Reaction Daughter      Thyroid Disease Son      Asthma Child      Osteoporosis Maternal Aunt      Breast Cancer Maternal Aunt      Social History     Socioeconomic History     Marital status:      Spouse name: Darwin     Number of children: 2     Years of education: 16     Highest  education level: Not on file   Occupational History     Occupation:      Comment: [employer]   Social Needs     Financial resource strain: Not on file     Food insecurity:     Worry: Not on file     Inability: Not on file     Transportation needs:     Medical: Not on file     Non-medical: Not on file   Tobacco Use     Smoking status: Former Smoker     Smokeless tobacco: Never Used     Tobacco comment: quit 12 years ago   Substance and Sexual Activity     Alcohol use: Yes     Comment: social     Drug use: No     Sexual activity: Yes     Partners: Male     Birth control/protection: Post-menopausal   Lifestyle     Physical activity:     Days per week: Not on file     Minutes per session: Not on file     Stress: Not on file   Relationships     Social connections:     Talks on phone: Not on file     Gets together: Not on file     Attends Rastafari service: Not on file     Active member of club or organization: Not on file     Attends meetings of clubs or organizations: Not on file     Relationship status: Not on file     Intimate partner violence:     Fear of current or ex partner: Not on file     Emotionally abused: Not on file     Physically abused: Not on file     Forced sexual activity: Not on file   Other Topics Concern     Parent/sibling w/ CABG, MI or angioplasty before 65F 55M? Not Asked   Social History Narrative     Not on file       Allergies:  Sulfa [sulfacetamide sodium]    Current Outpatient Medications   Medication Sig Dispense Refill     atorvastatin (LIPITOR) 20 MG tablet Take 1 tablet (20 mg) by mouth daily 90 tablet 3     IBANdronate (BONIVA) 150 MG tablet Take 1 tablet (150 mg) by mouth every 30 days 12 tablet 1     PROAIR  (90 BASE) MCG/ACT IN AERS INHALE 1 TO 2 PUFFS EVERY 4 TO 6 HOURS AS NEEDED 1 0     SPIRIVA HANDIHALER 18 MCG IN CAPS 1 CAPSULE DAILY       SYMBICORT 80-4.5 MCG/ACT IN AERO two puffs bid       venlafaxine (EFFEXOR-XR) 150 MG 24 hr capsule Take 1 capsule  (150 mg) by mouth daily 90 capsule 3       ROS: ROS: 10 point ROS neg other than the symptoms noted above in the HPI.    EXAM:  Vitals: /82   Wt 83.5 kg (184 lb)   LMP 02/06/1906   BMI 32.59 kg/m    BMI= Body mass index is 32.59 kg/m .  Constitutional: healthy, alert and no distress  Head: Normocephalic. No masses, lesions, tenderness or abnormalities  Neck: Neck supple. No adenopathy. Thyroid symmetric, normal size,, Carotids without bruits.  ENT: NEGATIVE for ear, mouth and throat problems  Breast:  breasts symmetric, no dominant or suspicious mass, no skin or nipple changes, no axillary adenopathy, unchanged from previous exam or self exam in taught and encouraged  Cardiovascular: negative, PMI normal. No lifts, heaves, or thrills. RRR. No murmurs, clicks gallops or rub  Respiratory: negative, Percussion normal. Good diaphragmatic excursion. Lungs clear  Gastrointestinal: Abdomen soft, non-tender. BS normal. No masses, organomegaly  Genitourinary: Pelvic Exam:  External Genitalia:     Normal appearance for age, no discharge present, no tenderness present, no inflammatory lesions present, color normal  Vagina:     Normal vaginal vault without central or paravaginal defects, ATROPHIC  Bladder:     Nontender to palpation  Urethra:   Urethral Body:  Urethra palpation normal, urethra structural support normal   Urethral Meatus:  No erythema or lesions present  Cervix:     Appearance healthy, no lesions present, nontender to palpation, no bleeding present  Uterus:     Nontender to palpation, no masses present, position anteflexed, mobility: normal  Adnexa:     No adnexal tenderness present, no adnexal masses present  Perineum:     Perineum within normal limits, no evidence of trauma, no rashes or skin lesions present  Inguinal Lymph Nodes:     No lymphadenopathy present    Musculoskeletalextremities normal- no gross deformities noted, gait normal and normal muscle tone  Integument: no suspicious lesions or  rashes  Neurologic: Gait normal. Reflexes normal and symmetric. Sensation grossly WNL.  Psychiatric: mentation appears normal and affect normal/bright  Hematologic/Lymphatic/Immunologic: Normal cervical lymph nodes     ASSESSMENT:/PLAN:  (Z12.31) Encounter for screening mammogram for breast cancer  (primary encounter diagnosis)  Comment: Recommendations reviewed  Plan: MA Screen Left w/Abilio, MA Screen Bilateral         w/Abilio        Screening mammogram with tomosynthesis ordered    (Z01.411) Encounter for gynecological examination with abnormal finding  Comment: Otherwise unchanged GYN exam  Plan: UA reflex to Microscopic and Culture        Done    (E78.5) Hyperlipidemia LDL goal <130  Comment: Previous values reviewed  Plan: atorvastatin (LIPITOR) 20 MG tablet        Continue with medication    (M81.0) Age-related osteoporosis without current pathological fracture  Comment: As above  Plan: Recommend continuing with bisphosphonate use.  Return 1 year or as needed      Joel Barrera M.D.

## 2019-09-10 NOTE — TELEPHONE ENCOUNTER
Received a message in Epic that the atorvastatin rx that was e-prescribed this morning failed. I called the pharmacy and gave them a verbal order for the medication as written by Dr Barrera.        Leatha Myles RN

## 2020-01-08 ENCOUNTER — HOSPITAL ENCOUNTER (OUTPATIENT)
Dept: MAMMOGRAPHY | Facility: CLINIC | Age: 70
Discharge: HOME | End: 2020-01-08
Attending: OBSTETRICS & GYNECOLOGY | Admitting: OBSTETRICS & GYNECOLOGY
Payer: COMMERCIAL

## 2020-01-08 DIAGNOSIS — Z12.31 ENCOUNTER FOR SCREENING MAMMOGRAM FOR BREAST CANCER: ICD-10-CM

## 2020-01-08 PROCEDURE — 77067 SCR MAMMO BI INCL CAD: CPT

## 2020-07-01 ENCOUNTER — TELEPHONE (OUTPATIENT)
Dept: FAMILY MEDICINE | Facility: CLINIC | Age: 70
End: 2020-07-01

## 2020-07-01 NOTE — TELEPHONE ENCOUNTER
Routing to team to inform and assist with scheduling. Thank you very much.     Sola Mcdonough RN, BSN  [facility]

## 2020-07-01 NOTE — TELEPHONE ENCOUNTER
Reason for Call:  Other     Detailed comments: Pt want to speak with Dr Melgoza to ask him if he can increase her depression medication, she is having heard time right now.    Phone Number Patient can be reached at: Cell number on file:    Telephone Information:   Mobile [phone]       Best Time: anytime    Can we leave a detailed message on this number? YES    Call taken on 7/1/2020 at 1:52 PM by Marbella Ortiz

## 2020-07-09 ENCOUNTER — VIRTUAL VISIT (OUTPATIENT)
Dept: FAMILY MEDICINE | Facility: CLINIC | Age: 70
End: 2020-07-09
Payer: COMMERCIAL

## 2020-07-09 DIAGNOSIS — F43.21 ADJUSTMENT DISORDER WITH DEPRESSED MOOD: ICD-10-CM

## 2020-07-09 DIAGNOSIS — F41.9 ANXIETY: ICD-10-CM

## 2020-07-09 DIAGNOSIS — F41.9 ANXIETY: Primary | ICD-10-CM

## 2020-07-09 PROCEDURE — 99213 OFFICE O/P EST LOW 20 MIN: CPT | Mod: 95 | Performed by: FAMILY MEDICINE

## 2020-07-09 RX ORDER — VENLAFAXINE HYDROCHLORIDE 225 MG/1
225 TABLET, EXTENDED RELEASE ORAL DAILY
Qty: 90 TABLET | Refills: 3 | Status: SHIPPED | OUTPATIENT
Start: 2020-07-09 | End: 2021-06-03

## 2020-07-09 ASSESSMENT — ANXIETY QUESTIONNAIRES
6. BECOMING EASILY ANNOYED OR IRRITABLE: NOT AT ALL
1. FEELING NERVOUS, ANXIOUS, OR ON EDGE: NEARLY EVERY DAY
5. BEING SO RESTLESS THAT IT IS HARD TO SIT STILL: SEVERAL DAYS
3. WORRYING TOO MUCH ABOUT DIFFERENT THINGS: NEARLY EVERY DAY
2. NOT BEING ABLE TO STOP OR CONTROL WORRYING: NEARLY EVERY DAY
IF YOU CHECKED OFF ANY PROBLEMS ON THIS QUESTIONNAIRE, HOW DIFFICULT HAVE THESE PROBLEMS MADE IT FOR YOU TO DO YOUR WORK, TAKE CARE OF THINGS AT HOME, OR GET ALONG WITH OTHER PEOPLE: SOMEWHAT DIFFICULT
7. FEELING AFRAID AS IF SOMETHING AWFUL MIGHT HAPPEN: NEARLY EVERY DAY
GAD7 TOTAL SCORE: 16

## 2020-07-09 ASSESSMENT — PATIENT HEALTH QUESTIONNAIRE - PHQ9
5. POOR APPETITE OR OVEREATING: NEARLY EVERY DAY
SUM OF ALL RESPONSES TO PHQ QUESTIONS 1-9: 13

## 2020-07-09 NOTE — PROGRESS NOTES
"Sushma Marie is a 70 year old female who is being evaluated via a billable telephone visit.      The patient has been notified of following:     \"This telephone visit will be conducted via a call between you and your physician/provider. We have found that certain health care needs can be provided without the need for a physical exam.  This service lets us provide the care you need with a short phone conversation.  If a prescription is necessary we can send it directly to your pharmacy.  If lab work is needed we can place an order for that and you can then stop by our lab to have the test done at a later time.    Telephone visits are billed at different rates depending on your insurance coverage. During this emergency period, for some insurers they may be billed the same as an in-person visit.  Please reach out to your insurance provider with any questions.    If during the course of the call the physician/provider feels a telephone visit is not appropriate, you will not be charged for this service.\"    Patient has given verbal consent for Telephone visit?  Yes    What phone number would you like to be contacted at? [phone]    How would you like to obtain your AVS? Scarlet Gutierrez     Sushma Marie is a 70 year old female who presents via phone visit today for the following health issues:    HPI  Depression Followup    How are you doing with your depression since your last visit? Worsened     Are you having other symptoms that might be associated with depression? No    Have you had a significant life event?  OTHER: covid 19      Are you feeling anxious or having panic attacks?   Yes:         Do you have any concerns with your use of alcohol or other drugs? No    Social History     Tobacco Use     Smoking status: Former Smoker     Smokeless tobacco: Never Used     Tobacco comment: quit 12 years ago   Substance Use Topics     Alcohol use: Yes     Comment: social     Drug use: No     PHQ 10/29/2018 4/8/2019 " 6/20/2019   PHQ-9 Total Score 12 11 11   Q9: Thoughts of better off dead/self-harm past 2 weeks Not at all Not at all Not at all     MATEUS-7 SCORE 5/7/2018 10/29/2018 6/20/2019   Total Score - - -   Total Score - - -   Total Score 7 8 7     Last PHQ-9 7/9/2020   1.  Little interest or pleasure in doing things 2   2.  Feeling down, depressed, or hopeless 2   3.  Trouble falling or staying asleep, or sleeping too much 2   4.  Feeling tired or having little energy 1   5.  Poor appetite or overeating 2   6.  Feeling bad about yourself 1   7.  Trouble concentrating 3   8.  Moving slowly or restless 0   Q9: Thoughts of better off dead/self-harm past 2 weeks 0   PHQ-9 Total Score 13   Difficulty at work, home, or with people Somewhat difficult     MATEUS-7  7/9/2020   1. Feeling nervous, anxious, or on edge 3   2. Not being able to stop or control worrying 3   3. Worrying too much about different things 3   4. Trouble relaxing 3   5. Being so restless that it is hard to sit still 1   6. Becoming easily annoyed or irritable 0   7. Feeling afraid, as if something awful might happen 3   MATEUS-7 Total Score 16   If you checked any problems, how difficult have they made it for you to do your work, take care of things at home, or get along with other people? Somewhat difficult         Suicide Assessment Five-step Evaluation and Treatment (SAFE-T)      How many servings of fruits and vegetables do you eat daily?  0-1    On average, how many sweetened beverages do you drink each day (Examples: soda, juice, sweet tea, etc.  Do NOT count diet or artificially sweetened beverages)?   0    How many days per week do you exercise enough to make your heart beat faster? 3 or less    How many minutes a day do you exercise enough to make your heart beat faster? 30 - 60    How many days per week do you miss taking your medication? 0        Patient Active Problem List   Diagnosis     Osteoporosis     COPD (chronic obstructive pulmonary disease) (H)      Dyslipidemia     HYPERLIPIDEMIA LDL GOAL <130     Adjustment disorder with depressed mood     Advanced directives, counseling/discussion     Past Surgical History:   Procedure Laterality Date     HC REMOVE TONSILS/ADENOIDS,<13 Y/O         Social History     Tobacco Use     Smoking status: Former Smoker     Smokeless tobacco: Never Used     Tobacco comment: quit 12 years ago   Substance Use Topics     Alcohol use: Yes     Comment: social     Family History   Problem Relation Age of Onset     Cancer Mother          stomach cancer     Respiratory Father          emphysema and dementia     Family History Negative Brother          drug overdose     Family History Negative Sister      Family History Negative Sister          from status asthmaticus     Heart Disease Maternal Grandmother          stroke     Heart Disease Maternal Grandfather          MI     Anesthesia Reaction Daughter      Thyroid Disease Son      Asthma Child      Osteoporosis Maternal Aunt      Breast Cancer Maternal Aunt          Current Outpatient Medications   Medication Sig Dispense Refill     atorvastatin (LIPITOR) 20 MG tablet Take 1 tablet (20 mg) by mouth daily 90 tablet 3     IBANdronate (BONIVA) 150 MG tablet Take 1 tablet (150 mg) by mouth every 30 days 12 tablet 1     PROAIR  (90 BASE) MCG/ACT IN AERS INHALE 1 TO 2 PUFFS EVERY 4 TO 6 HOURS AS NEEDED 1 0     SPIRIVA HANDIHALER 18 MCG IN CAPS 1 CAPSULE DAILY       SYMBICORT 80-4.5 MCG/ACT IN AERO two puffs bid       venlafaxine (EFFEXOR-ER) 225 MG 24 hr tablet Take 1 tablet (225 mg) by mouth daily 90 tablet 3     venlafaxine (EFFEXOR-XR) 150 MG 24 hr capsule Take 1 capsule (150 mg) by mouth daily 90 capsule 3     Allergies   Allergen Reactions     Sulfa [Sulfacetamide Sodium] GI Disturbance     Recent Labs   Lab Test 19  0953 18  1604 17  1453 16  1430   LDL 84 99 97 84   HDL 74 73 96 69   TRIG 117 131 142 172*   ALT  30 32  --  26   CR 0.79 0.74  --  0.71   GFRESTIMATED 76 78  --  82   GFRESTBLACK 88 >90  --  >90  African American GFR Calc     POTASSIUM 4.0 4.0  --  3.9      BP Readings from Last 3 Encounters:   09/09/19 136/82   06/20/19 132/80   10/29/18 134/80    Wt Readings from Last 3 Encounters:   09/09/19 83.5 kg (184 lb)   06/20/19 85.7 kg (189 lb)   10/29/18 88.5 kg (195 lb)                    Reviewed and updated as needed this visit by Provider         Review of Systems   Constitutional, HEENT, cardiovascular, pulmonary, gi and gu systems are negative, except as otherwise noted.       Objective   Reported vitals:  LMP 02/06/1906    healthy, alert and no distress  PSYCH: Alert and oriented times 3; coherent speech, normal   rate and volume, able to articulate logical thoughts, able   to abstract reason, no tangential thoughts, no hallucinations   or delusions  Her affect is normal  RESP: No cough, no audible wheezing, able to talk in full sentences  Remainder of exam unable to be completed due to telephone visits    Diagnostic Test Results:  Labs reviewed in Epic        Assessment/Plan:  1. Adjustment disorder with depressed mood  Worsening mood disorder related with pandemic, will have her to increase the dose of SNRI(Effexor) to 225mg  - venlafaxine (EFFEXOR-ER) 225 MG 24 hr tablet; Take 1 tablet (225 mg) by mouth daily  Dispense: 90 tablet; Refill: 3    2. Anxiety  Has on HI/SI, will keep monitoring with increased dose   - venlafaxine (EFFEXOR-ER) 225 MG 24 hr tablet; Take 1 tablet (225 mg) by mouth daily  Dispense: 90 tablet; Refill: 3    No follow-ups on file.      Phone call duration:  12 minutes    Manjit Melgoza MD

## 2020-07-10 ASSESSMENT — ANXIETY QUESTIONNAIRES: GAD7 TOTAL SCORE: 16

## 2020-07-11 RX ORDER — VENLAFAXINE HYDROCHLORIDE 150 MG/1
150 CAPSULE, EXTENDED RELEASE ORAL DAILY
Qty: 90 CAPSULE | Refills: 1 | Status: SHIPPED | OUTPATIENT
Start: 2020-07-11 | End: 2021-01-05

## 2020-07-11 RX ORDER — VENLAFAXINE HYDROCHLORIDE 75 MG/1
75 CAPSULE, EXTENDED RELEASE ORAL DAILY
Qty: 90 CAPSULE | Refills: 1 | Status: SHIPPED | OUTPATIENT
Start: 2020-07-11 | End: 2021-01-05

## 2020-07-11 RX ORDER — VENLAFAXINE HYDROCHLORIDE 225 MG/1
225 TABLET, EXTENDED RELEASE ORAL DAILY
Qty: 90 TABLET | Refills: 3 | Status: CANCELLED | OUTPATIENT
Start: 2020-07-11

## 2020-09-01 ENCOUNTER — TRANSFERRED RECORDS (OUTPATIENT)
Dept: HEALTH INFORMATION MANAGEMENT | Facility: CLINIC | Age: 70
End: 2020-09-01

## 2020-09-10 ENCOUNTER — TELEPHONE (OUTPATIENT)
Dept: FAMILY MEDICINE | Facility: CLINIC | Age: 70
End: 2020-09-10

## 2020-09-10 NOTE — TELEPHONE ENCOUNTER
Needs of attention regarding:  -Wellness (Physical) Visit     Health Maintenance Topics with due status: Overdue       Topic Date Due    HEPATITIS C SCREENING 1950    COPD ACTION PLAN 1950    INFLUENZA VACCINE 09/01/2020     Health Maintenance Topics with due status: Due On       Topic Date Due    ZOSTER IMMUNIZATION 03/31/2020    MEDICARE ANNUAL WELLNESS VISIT 09/09/2020       Communication:  See Letter

## 2020-09-14 ENCOUNTER — APPOINTMENT (OUTPATIENT)
Dept: URBAN - METROPOLITAN AREA CLINIC 256 | Age: 70
Setting detail: DERMATOLOGY
End: 2020-09-14

## 2020-09-14 VITALS — RESPIRATION RATE: 14 BRPM | HEIGHT: 63.5 IN | WEIGHT: 190 LBS

## 2020-09-14 DIAGNOSIS — L57.8 OTHER SKIN CHANGES DUE TO CHRONIC EXPOSURE TO NONIONIZING RADIATION: ICD-10-CM

## 2020-09-14 DIAGNOSIS — L82.1 OTHER SEBORRHEIC KERATOSIS: ICD-10-CM

## 2020-09-14 DIAGNOSIS — D485 NEOPLASM OF UNCERTAIN BEHAVIOR OF SKIN: ICD-10-CM

## 2020-09-14 DIAGNOSIS — D69.2 OTHER NONTHROMBOCYTOPENIC PURPURA: ICD-10-CM

## 2020-09-14 DIAGNOSIS — D18.0 HEMANGIOMA: ICD-10-CM

## 2020-09-14 DIAGNOSIS — I78.8 OTHER DISEASES OF CAPILLARIES: ICD-10-CM

## 2020-09-14 DIAGNOSIS — L81.4 OTHER MELANIN HYPERPIGMENTATION: ICD-10-CM

## 2020-09-14 PROBLEM — D18.01 HEMANGIOMA OF SKIN AND SUBCUTANEOUS TISSUE: Status: ACTIVE | Noted: 2020-09-14

## 2020-09-14 PROBLEM — D48.5 NEOPLASM OF UNCERTAIN BEHAVIOR OF SKIN: Status: ACTIVE | Noted: 2020-09-14

## 2020-09-14 PROCEDURE — OTHER PATHOLOGY BILLING: OTHER

## 2020-09-14 PROCEDURE — 99214 OFFICE O/P EST MOD 30 MIN: CPT | Mod: 25

## 2020-09-14 PROCEDURE — 88305 TISSUE EXAM BY PATHOLOGIST: CPT

## 2020-09-14 PROCEDURE — OTHER BIOPSY BY SHAVE METHOD: OTHER

## 2020-09-14 PROCEDURE — OTHER COUNSELING: OTHER

## 2020-09-14 PROCEDURE — 11102 TANGNTL BX SKIN SINGLE LES: CPT

## 2020-09-14 PROCEDURE — OTHER REASSURANCE: OTHER

## 2020-09-14 ASSESSMENT — LOCATION DETAILED DESCRIPTION DERM
LOCATION DETAILED: LEFT NASAL ALA
LOCATION DETAILED: NASAL DORSUM

## 2020-09-14 ASSESSMENT — LOCATION SIMPLE DESCRIPTION DERM
LOCATION SIMPLE: LEFT NOSE
LOCATION SIMPLE: NOSE

## 2020-09-14 ASSESSMENT — LOCATION ZONE DERM: LOCATION ZONE: NOSE

## 2020-09-14 NOTE — PROCEDURE: PATHOLOGY BILLING
Immunohistochemistry (48580 and [id number]) billing is not performed here. Please use the Immunohistochemistry Stain Billing plan to accomplish this. Immunohistochemistry (62503 and [id number]) billing is not performed here. Please use the Immunohistochemistry Stain Billing plan to accomplish this.

## 2020-09-14 NOTE — PROCEDURE: BIOPSY BY SHAVE METHOD
Was A Bandage Applied: Yes
Hide Accession Number?: No
Anesthesia Type: 2% lidocaine with epinephrine and a 1:10 solution of 8.4% sodium bicarbonate
Notification Instructions: Patient will be notified of biopsy results. However, patient instructed to call the office if not contacted within 2 weeks.
Depth Of Biopsy: dermis
Biopsy Method: Dermablade
Curettage Text: The wound bed was treated with curettage after the biopsy was performed.
Silver Nitrate Text: The wound bed was treated with silver nitrate after the biopsy was performed.
Hemostasis: Drysol
Post-Care Instructions: I reviewed with the patient in detail post-care instructions. Patient is to keep the biopsy site dry overnight, and then apply bacitracin twice daily until healed. Patient may apply hydrogen peroxide soaks to remove any crusting.
Electrodesiccation And Curettage Text: The wound bed was treated with electrodesiccation and curettage after the biopsy was performed.
X Size Of Lesion In Cm: 0
Detail Level: Detailed
Type Of Destruction Used: Curettage
Dressing: bandage
Cryotherapy Text: The wound bed was treated with cryotherapy after the biopsy was performed.
Wound Care: Petrolatum
Information: Selecting Yes will display possible errors in your note based on the variables you have selected. This validation is only offered as a suggestion for you. PLEASE NOTE THAT THE VALIDATION TEXT WILL BE REMOVED WHEN YOU FINALIZE YOUR NOTE. IF YOU WANT TO FAX A PRELIMINARY NOTE YOU WILL NEED TO TOGGLE THIS TO 'NO' IF YOU DO NOT WANT IT IN YOUR FAXED NOTE.
Consent: Written consent was obtained and risks were reviewed including but not limited to scarring, infection, bleeding, scabbing, incomplete removal, nerve damage and allergy to anesthesia.
Billing Type: Client Bill
Anesthesia Volume In Cc (Will Not Render If 0): 0.7
Biopsy Type: H and E
Electrodesiccation Text: The wound bed was treated with electrodesiccation after the biopsy was performed.

## 2020-09-22 ENCOUNTER — APPOINTMENT (OUTPATIENT)
Dept: URBAN - METROPOLITAN AREA CLINIC 256 | Age: 70
Setting detail: DERMATOLOGY
End: 2020-09-28

## 2020-09-22 DIAGNOSIS — L57.0 ACTINIC KERATOSIS: ICD-10-CM

## 2020-09-22 PROCEDURE — 17000 DESTRUCT PREMALG LESION: CPT

## 2020-09-22 PROCEDURE — OTHER LIQUID NITROGEN: OTHER

## 2020-09-22 PROCEDURE — OTHER ADDITIONAL NOTES: OTHER

## 2020-09-22 ASSESSMENT — LOCATION ZONE DERM: LOCATION ZONE: NOSE

## 2020-09-22 ASSESSMENT — LOCATION DETAILED DESCRIPTION DERM: LOCATION DETAILED: NASAL SUPRATIP

## 2020-09-22 ASSESSMENT — LOCATION SIMPLE DESCRIPTION DERM: LOCATION SIMPLE: NOSE

## 2020-09-22 NOTE — PROCEDURE: LIQUID NITROGEN
Post-Care Instructions: I reviewed with the patient in detail post-care instructions. Patient is to wear sunprotection, and avoid picking at any of the treated lesions. Pt may apply Vaseline to crusted or scabbing areas.
Number Of Freeze-Thaw Cycles: 2 freeze-thaw cycles
Detail Level: Detailed
Render Note In Bullet Format When Appropriate: No
Duration Of Freeze Thaw-Cycle (Seconds): 0
Render Post-Care Instructions In Note?: yes
Consent: The patient's consent was obtained including but not limited to risks of crusting, scabbing, blistering, scarring, darker or lighter pigmentary change, recurrence, incomplete removal and infection.

## 2020-10-09 DIAGNOSIS — E78.5 HYPERLIPIDEMIA LDL GOAL <130: ICD-10-CM

## 2020-10-09 RX ORDER — ATORVASTATIN CALCIUM 20 MG/1
20 TABLET, FILM COATED ORAL DAILY
Qty: 90 TABLET | Refills: 0 | Status: SHIPPED | OUTPATIENT
Start: 2020-10-09

## 2020-10-09 NOTE — TELEPHONE ENCOUNTER
atorvastatin      Last Written Prescription Date:  9/10/19  Last Fill Quantity: 90,   # refills: 3  Last Office Visit: 9/9/19  Future Office visit:

## 2020-10-09 NOTE — TELEPHONE ENCOUNTER
Medication is being filled for 1 time refill only due to:  Patient needs labs Lipids. Patient needs to be seen because it has been more than one year since last visit.

## 2021-01-05 DIAGNOSIS — F41.9 ANXIETY: ICD-10-CM

## 2021-01-05 DIAGNOSIS — F43.21 ADJUSTMENT DISORDER WITH DEPRESSED MOOD: ICD-10-CM

## 2021-01-05 RX ORDER — VENLAFAXINE HYDROCHLORIDE 150 MG/1
150 CAPSULE, EXTENDED RELEASE ORAL DAILY
Qty: 90 CAPSULE | Refills: 1 | Status: SHIPPED | OUTPATIENT
Start: 2021-01-05 | End: 2021-06-03

## 2021-01-05 RX ORDER — VENLAFAXINE HYDROCHLORIDE 75 MG/1
CAPSULE, EXTENDED RELEASE ORAL
Qty: 90 CAPSULE | Refills: 1 | Status: SHIPPED | OUTPATIENT
Start: 2021-01-05 | End: 2021-06-03

## 2021-01-05 NOTE — TELEPHONE ENCOUNTER
Routing refill request to provider for review/approval because:  Labs not current:  BP, Cr, phq9  Patient needs to be seen because:  Patient has not been seen within the last 6 months    ROGER Borjas, RN  [facility]

## 2021-01-14 ENCOUNTER — HEALTH MAINTENANCE LETTER (OUTPATIENT)
Age: 71
End: 2021-01-14

## 2021-03-14 ENCOUNTER — HEALTH MAINTENANCE LETTER (OUTPATIENT)
Age: 71
End: 2021-03-14

## 2021-05-05 ENCOUNTER — HOSPITAL ENCOUNTER (OUTPATIENT)
Dept: MAMMOGRAPHY | Facility: CLINIC | Age: 71
Discharge: HOME | End: 2021-05-05
Attending: OBSTETRICS & GYNECOLOGY | Admitting: OBSTETRICS & GYNECOLOGY
Payer: COMMERCIAL

## 2021-05-05 DIAGNOSIS — Z12.31 VISIT FOR SCREENING MAMMOGRAM: ICD-10-CM

## 2021-05-05 PROCEDURE — 77063 BREAST TOMOSYNTHESIS BI: CPT

## 2021-06-03 ENCOUNTER — OFFICE VISIT (OUTPATIENT)
Dept: OBGYN | Facility: CLINIC | Age: 71
End: 2021-06-03
Payer: COMMERCIAL

## 2021-06-03 VITALS — WEIGHT: 192 LBS | DIASTOLIC BLOOD PRESSURE: 76 MMHG | SYSTOLIC BLOOD PRESSURE: 138 MMHG | BODY MASS INDEX: 34.01 KG/M2

## 2021-06-03 DIAGNOSIS — Z12.4 SCREENING FOR MALIGNANT NEOPLASM OF CERVIX: ICD-10-CM

## 2021-06-03 DIAGNOSIS — Z01.419 ENCOUNTER FOR GYNECOLOGICAL EXAMINATION WITHOUT ABNORMAL FINDING: Primary | ICD-10-CM

## 2021-06-03 PROCEDURE — 87624 HPV HI-RISK TYP POOLED RSLT: CPT | Performed by: OBSTETRICS & GYNECOLOGY

## 2021-06-03 PROCEDURE — 99214 OFFICE O/P EST MOD 30 MIN: CPT | Performed by: OBSTETRICS & GYNECOLOGY

## 2021-06-03 PROCEDURE — G0145 SCR C/V CYTO,THINLAYER,RESCR: HCPCS | Performed by: OBSTETRICS & GYNECOLOGY

## 2021-06-03 RX ORDER — LOSARTAN POTASSIUM 25 MG/1
25 TABLET ORAL DAILY
COMMUNITY
Start: 2021-05-17

## 2021-06-03 ASSESSMENT — PATIENT HEALTH QUESTIONNAIRE - PHQ9: SUM OF ALL RESPONSES TO PHQ QUESTIONS 1-9: 11

## 2021-06-03 NOTE — NURSING NOTE
"Chief Complaint   Patient presents with     Gyn Exam       Initial /76   Wt 87.1 kg (192 lb)   LMP 1906   BMI 34.01 kg/m   Estimated body mass index is 34.01 kg/m  as calculated from the following:    Height as of 19: 1.6 m (5' 3\").    Weight as of this encounter: 87.1 kg (192 lb).  BP completed using cuff size: regular    Questioned patient about current smoking habits.  Pt. quit smoking some time ago.          The following HM Due: NONE      The following patient reported/Care Every where data was sent to:  P ABSTRACT QUALITY INITIATIVES [[id number]]        China Dumont [facility]                 "

## 2021-06-03 NOTE — PROGRESS NOTES
HPI:  Sushma Marie is a 70 year old white female  ,menopause for contraception not on HRT with no vaginal bleeding who presents for an annual GYN exam and   pap.  She is following with Dr. Melgoza for routine health care maintenance.  Her COPD is in good control.  The patient has seen dermatology recently for a mole check.  I reviewed the results of her recent mammogram lipid panel and colon cancer screening.  The patient has received the Pfizer Covid vaccine and is otherwise doing well without concerns  Self breast exam,  ACS screening mammogram recs, the use of 81 mg ASA to decrease the risk of heart disease, lipid screening, colon cancer screening recs and Dexa scan recs thoroughly reveiwed.      Past Medical History:   Diagnosis Date     COPD (chronic obstructive pulmonary disease) (H)      Hyperlipidemia      Mild intermittent asthma     Exercise induced     Osteoporosis, unspecified      Past Surgical History:   Procedure Laterality Date     HC REMOVE TONSILS/ADENOIDS,<11 Y/O       Family History   Problem Relation Age of Onset     Cancer Mother          stomach cancer     Respiratory Father          emphysema and dementia     Family History Negative Brother          drug overdose     Family History Negative Sister      Family History Negative Sister          from status asthmaticus     Heart Disease Maternal Grandmother          stroke     Heart Disease Maternal Grandfather          MI     Anesthesia Reaction Daughter      Thyroid Disease Son      Asthma Child      Osteoporosis Maternal Aunt      Breast Cancer Maternal Aunt      Social History     Socioeconomic History     Marital status:      Spouse name: Darwin     Number of children: 2     Years of education: 16     Highest education level: Not on file   Occupational History     Occupation:      Comment: [employer]   Social Needs     Financial resource strain: Not on file      Food insecurity     Worry: Not on file     Inability: Not on file     Transportation needs     Medical: Not on file     Non-medical: Not on file   Tobacco Use     Smoking status: Former Smoker     Smokeless tobacco: Never Used     Tobacco comment: quit 12 years ago   Substance and Sexual Activity     Alcohol use: Yes     Comment: social     Drug use: No     Sexual activity: Yes     Partners: Male     Birth control/protection: Post-menopausal   Lifestyle     Physical activity     Days per week: Not on file     Minutes per session: Not on file     Stress: Not on file   Relationships     Social connections     Talks on phone: Not on file     Gets together: Not on file     Attends Mosque service: Not on file     Active member of club or organization: Not on file     Attends meetings of clubs or organizations: Not on file     Relationship status: Not on file     Intimate partner violence     Fear of current or ex partner: Not on file     Emotionally abused: Not on file     Physically abused: Not on file     Forced sexual activity: Not on file   Other Topics Concern     Parent/sibling w/ CABG, MI or angioplasty before 65F 55M? Not Asked   Social History Narrative     Not on file       Allergies:  Sulfa [sulfacetamide sodium]    Current Outpatient Medications   Medication Sig Dispense Refill     atorvastatin (LIPITOR) 20 MG tablet Take 1 tablet (20 mg) by mouth daily 90 tablet 0     IBANdronate (BONIVA) 150 MG tablet Take 1 tablet (150 mg) by mouth every 30 days 12 tablet 1     losartan (COZAAR) 25 MG tablet Take 25 mg by mouth daily       PROAIR  (90 BASE) MCG/ACT IN AERS INHALE 1 TO 2 PUFFS EVERY 4 TO 6 HOURS AS NEEDED 1 0     SPIRIVA HANDIHALER 18 MCG IN CAPS 1 CAPSULE DAILY       SYMBICORT 80-4.5 MCG/ACT IN AERO two puffs bid       venlafaxine (EFFEXOR-XR) 150 MG 24 hr capsule Take 1 capsule (150 mg) by mouth daily 90 capsule 3       ROS: ROS: 10 point ROS neg other than the symptoms noted above in the  HPI.    EXAM:  Vitals: /76   Wt 87.1 kg (192 lb)   LMP 02/06/1906   BMI 34.01 kg/m    BMI= Body mass index is 34.01 kg/m .  Constitutional: healthy, alert and no distress  Cardiovascular: negative, PMI normal. No lifts, heaves, or thrills. RRR. No murmurs, clicks gallops or rub  Respiratory: negative, Percussion normal. Good diaphragmatic excursion. Lungs clear  Gastrointestinal: Abdomen soft, non-tender. BS normal. No masses, organomegaly  Genitourinary: Pelvic Exam:  External Genitalia:     Normal appearance for age, no discharge present, no tenderness present, no inflammatory lesions present, color normal  Vagina:     Normal vaginal vault without central or paravaginal defects, ATROPHIC  Bladder:     Nontender to palpation  Urethra:   Urethral Body:  Urethra palpation normal, urethra structural support normal   Urethral Meatus:  No erythema or lesions present  Cervix:     Appearance healthy, no lesions present, nontender to palpation, no bleeding present Pap smear done atrophic change present  Uterus:     Nontender to palpation, no masses present, position anteflexed, mobility: normal  Adnexa:     No adnexal tenderness present, no adnexal masses present  Perineum:     Perineum within normal limits, no evidence of trauma, no rashes or skin lesions present  Inguinal Lymph Nodes:     No lymphadenopathy present       ASSESSMENT:/PLAN:  (Z01.419) Encounter for gynecological examination without abnormal finding  (primary encounter diagnosis)  Comment: Otherwise unremarkable GYN exam in a postmenopausal female.  Health care maintenance issues reviewed  Plan: Return 1 year as needed      Joel Barrera M.D.

## 2021-06-03 NOTE — PATIENT INSTRUCTIONS
You can reach your [facility] Care Team any time of the day by calling [phone]. This number will put you in touch with the 24 hour nurse line if the clinic is closed.    To contact your OB/GYN Station Coordinator/Surgery Scheduler please call [phone]. This is a direct number for your care team between 8 a.m. and 4 p.m. Monday through Friday.    [facility] is open for your convenience:  Monday through Friday 8 a.m. to 6 p.m.  Closed weekends and all major holidays.

## 2021-06-08 LAB
COPATH REPORT: NORMAL
PAP: NORMAL

## 2021-06-11 LAB
FINAL DIAGNOSIS: NORMAL
HPV HR 12 DNA CVX QL NAA+PROBE: NEGATIVE
HPV16 DNA SPEC QL NAA+PROBE: NEGATIVE
HPV18 DNA SPEC QL NAA+PROBE: NEGATIVE
SPECIMEN DESCRIPTION: NORMAL
SPECIMEN SOURCE CVX/VAG CYTO: NORMAL

## 2021-08-09 DIAGNOSIS — F43.21 ADJUSTMENT DISORDER WITH DEPRESSED MOOD: ICD-10-CM

## 2021-08-10 NOTE — TELEPHONE ENCOUNTER
PHQ 9 and MATEUS 7 sent via my chart.  Also advised pt is due for physical with Dr. Melgoza.    Kelli ALLEN RN  EP Triage

## 2021-08-13 NOTE — TELEPHONE ENCOUNTER
Called pt; Left non detailed voice message for pt to call back and speak with RN.     On Call back: PHQ9, MATEUS 7 and schedule annual physical with Dr. Melgoza.     Nathalie Quintana RN

## 2021-08-16 NOTE — TELEPHONE ENCOUNTER
Called and left non detailed message to call and speak with a triage nurse re med refill for venlafaxine (EFFEXOR-XR) 150 MG 24 hr capsule Also advised of My Chart message sent.    Melva Pritchard RN

## 2021-08-18 RX ORDER — VENLAFAXINE HYDROCHLORIDE 150 MG/1
CAPSULE, EXTENDED RELEASE ORAL
Qty: 30 CAPSULE | Refills: 0 | Status: SHIPPED | OUTPATIENT
Start: 2021-08-18

## 2021-08-18 NOTE — TELEPHONE ENCOUNTER
3 attempts made to contact pt for updated PHQ9, labs and follow up visit.     PHQ 6/20/2019 7/9/2020 6/3/2021   PHQ-9 Total Score 11 13 11   Q9: Thoughts of better off dead/self-harm past 2 weeks Not at all Not at all Not at all       Nathalie Quintana RN

## 2021-10-23 ENCOUNTER — HEALTH MAINTENANCE LETTER (OUTPATIENT)
Age: 71
End: 2021-10-23

## 2021-11-23 ENCOUNTER — TRANSFERRED RECORDS (OUTPATIENT)
Dept: HEALTH INFORMATION MANAGEMENT | Facility: CLINIC | Age: 71
End: 2021-11-23
Payer: COMMERCIAL

## 2022-02-12 ENCOUNTER — HEALTH MAINTENANCE LETTER (OUTPATIENT)
Age: 72
End: 2022-02-12

## 2022-03-22 ENCOUNTER — TELEPHONE (OUTPATIENT)
Dept: FAMILY MEDICINE | Facility: CLINIC | Age: 72
End: 2022-03-22

## 2022-03-22 NOTE — TELEPHONE ENCOUNTER
Patient Quality Outreach    Patient is due for the following:   Physical     NEXT STEPS:   Schedule a yearly physical    Type of outreach:    Sent letter.      Questions for provider review:         Belinda Lutz CMA

## 2022-03-22 NOTE — LETTER
March 22, 2022      Sushma Marie  [address] [place] [address]        Dear Sushma,    I care about your health and have reviewed your health plan. I have reviewed your medical conditions, medication list, and lab results and am making recommendations based on this review, to better manage your health.    You are in particular need of attention regarding:  -Wellness (Physical) Visit     I am recommending that you:  -schedule a WELLNESS (Physical) APPOINTMENT with me.   I will check fasting labs the same day - nothing to eat except water and meds for 8-10 hours prior.    Here is a list of Health Maintenance topics that are due now or due soon:  Health Maintenance Due   Topic Date Due     ANNUAL REVIEW OF HM ORDERS  Never done     COPD Action Plan  Never done     Hepatitis C Screening  Never done     LUNG CANCER SCREENING  Never done     Annual Wellness Visit  09/09/2020     FALL RISK ASSESSMENT  07/09/2021     COVID-19 Vaccine (3 - Booster for Pfizer series) 07/12/2021     Flu Vaccine (1) 09/01/2021     Depression Assessment  12/03/2021       Please call us at [phone] (or use [facility]) to address the above recommendations.     Thank you for trusting [facility] and we appreciate the opportunity to serve you.  We look forward to supporting your healthcare needs in the future.    Healthy Regards,    Manjit Melgoza MD

## 2022-07-30 ENCOUNTER — HEALTH MAINTENANCE LETTER (OUTPATIENT)
Age: 72
End: 2022-07-30

## 2022-09-13 ENCOUNTER — HOSPITAL ENCOUNTER (OUTPATIENT)
Dept: MAMMOGRAPHY | Facility: CLINIC | Age: 72
Discharge: HOME | End: 2022-09-13
Attending: OBSTETRICS & GYNECOLOGY | Admitting: OBSTETRICS & GYNECOLOGY
Payer: COMMERCIAL

## 2022-09-13 DIAGNOSIS — Z12.31 VISIT FOR SCREENING MAMMOGRAM: ICD-10-CM

## 2022-09-13 PROCEDURE — 77067 SCR MAMMO BI INCL CAD: CPT

## 2022-10-10 ENCOUNTER — HEALTH MAINTENANCE LETTER (OUTPATIENT)
Age: 72
End: 2022-10-10

## 2023-01-26 ENCOUNTER — TRANSFERRED RECORDS (OUTPATIENT)
Dept: HEALTH INFORMATION MANAGEMENT | Facility: CLINIC | Age: 73
End: 2023-01-26

## 2023-03-25 ENCOUNTER — HEALTH MAINTENANCE LETTER (OUTPATIENT)
Age: 73
End: 2023-03-25

## 2024-01-07 ENCOUNTER — HEALTH MAINTENANCE LETTER (OUTPATIENT)
Age: 74
End: 2024-01-07

## 2024-01-17 ENCOUNTER — HOSPITAL ENCOUNTER (OUTPATIENT)
Dept: MAMMOGRAPHY | Facility: CLINIC | Age: 74
Discharge: HOME | End: 2024-01-17
Attending: OBSTETRICS & GYNECOLOGY | Admitting: OBSTETRICS & GYNECOLOGY
Payer: COMMERCIAL

## 2024-01-17 DIAGNOSIS — Z12.31 ENCOUNTER FOR SCREENING MAMMOGRAM FOR BREAST CANCER: ICD-10-CM

## 2024-01-17 PROCEDURE — 77063 BREAST TOMOSYNTHESIS BI: CPT

## 2024-01-23 ENCOUNTER — TRANSFERRED RECORDS (OUTPATIENT)
Dept: HEALTH INFORMATION MANAGEMENT | Facility: CLINIC | Age: 74
End: 2024-01-23
Payer: COMMERCIAL

## 2024-02-05 ENCOUNTER — TRANSFERRED RECORDS (OUTPATIENT)
Dept: HEALTH INFORMATION MANAGEMENT | Facility: CLINIC | Age: 74
End: 2024-02-05
Payer: COMMERCIAL

## 2024-05-26 ENCOUNTER — HEALTH MAINTENANCE LETTER (OUTPATIENT)
Age: 74
End: 2024-05-26

## 2024-08-15 ENCOUNTER — TRANSFERRED RECORDS (OUTPATIENT)
Dept: HEALTH INFORMATION MANAGEMENT | Facility: CLINIC | Age: 74
End: 2024-08-15
Payer: COMMERCIAL

## 2025-02-28 ENCOUNTER — HOSPITAL ENCOUNTER (OUTPATIENT)
Dept: MAMMOGRAPHY | Facility: CLINIC | Age: 75
Discharge: HOME | End: 2025-02-28
Attending: FAMILY MEDICINE | Admitting: FAMILY MEDICINE
Payer: COMMERCIAL

## 2025-02-28 DIAGNOSIS — Z12.31 VISIT FOR SCREENING MAMMOGRAM: ICD-10-CM

## 2025-02-28 PROCEDURE — 77067 SCR MAMMO BI INCL CAD: CPT

## 2025-02-28 PROCEDURE — 77063 BREAST TOMOSYNTHESIS BI: CPT

## 2025-06-14 ENCOUNTER — HEALTH MAINTENANCE LETTER (OUTPATIENT)
Age: 75
End: 2025-06-14